# Patient Record
Sex: MALE | Race: WHITE | HISPANIC OR LATINO | Employment: OTHER | URBAN - METROPOLITAN AREA
[De-identification: names, ages, dates, MRNs, and addresses within clinical notes are randomized per-mention and may not be internally consistent; named-entity substitution may affect disease eponyms.]

---

## 2018-11-06 ENCOUNTER — HOSPITAL ENCOUNTER (EMERGENCY)
Facility: HOSPITAL | Age: 68
Discharge: HOME | End: 2018-11-06
Attending: EMERGENCY MEDICINE
Payer: MEDICARE

## 2018-11-06 VITALS
DIASTOLIC BLOOD PRESSURE: 65 MMHG | TEMPERATURE: 98 F | HEIGHT: 60 IN | WEIGHT: 125 LBS | OXYGEN SATURATION: 100 % | BODY MASS INDEX: 24.54 KG/M2 | RESPIRATION RATE: 16 BRPM | HEART RATE: 62 BPM | SYSTOLIC BLOOD PRESSURE: 122 MMHG

## 2018-11-06 DIAGNOSIS — R13.10 DYSPHAGIA, UNSPECIFIED TYPE: Primary | ICD-10-CM

## 2018-11-06 PROCEDURE — 99284 EMERGENCY DEPT VISIT MOD MDM: CPT

## 2018-11-07 NOTE — ED PROVIDER NOTES
Encounter Date: 11/6/2018    SCRIBE #1 NOTE: I, Hima Beck, am scribing for, and in the presence of,  . I have scribed the entire note.       History     Chief Complaint   Patient presents with    Foreign Body In Throat     foreign body in throat after eating chinese food yesterday     Time seen by provider: 7:15 PM    This is a 68 y.o. male who presents with complaint of a foreign body stuck in his throat after eating yesterday evening around 1800. He describes the pain as mild and localized at the back of the throat making it difficult for him to eat or swallow. He was able to eat small bites of sweet potato during lunch today.  No vomiting. The patient reports that he has a HX of food getting stuck in his throat. He denies any chest pain, shortness of breath, vomiting or nausea.                  The history is provided by the patient.     Review of patient's allergies indicates:  No Known Allergies  History reviewed. No pertinent past medical history.  Past Surgical History:   Procedure Laterality Date    HERNIA REPAIR       History reviewed. No pertinent family history.  Social History     Tobacco Use    Smoking status: Current Every Day Smoker   Substance Use Topics    Alcohol use: No    Drug use: Not on file     Review of Systems   HENT: Positive for sore throat and trouble swallowing.    Respiratory: Negative for shortness of breath.    All other systems reviewed and are negative.      Physical Exam     Initial Vitals [11/06/18 1839]   BP Pulse Resp Temp SpO2   126/75 80 16 98.3 °F (36.8 °C) 98 %      MAP       --         Physical Exam    Nursing note and vitals reviewed.  Constitutional: He appears well-developed and well-nourished. He is not diaphoretic. No distress.   HENT:   Head: Normocephalic and atraumatic.   Mouth/Throat: Oropharynx is clear and moist.   Eyes: Conjunctivae and EOM are normal. Pupils are equal, round, and reactive to light.   Neck: Normal range of motion. Neck supple. No  tracheal deviation present.   Cardiovascular: Normal rate, regular rhythm, normal heart sounds and intact distal pulses.   Pulmonary/Chest: Breath sounds normal. No stridor. No respiratory distress.   Abdominal: Soft. He exhibits no distension and no mass. There is no tenderness.   Musculoskeletal: Normal range of motion. He exhibits no edema or tenderness.   Neurological: He is alert and oriented to person, place, and time. He has normal strength. No cranial nerve deficit or sensory deficit.   Skin: Skin is warm and dry. Capillary refill takes less than 2 seconds. No rash noted.   Psychiatric: He has a normal mood and affect. His behavior is normal. Thought content normal.         ED Course   Procedures  Labs Reviewed - No data to display       Imaging Results          CT Chest Without Contrast (Final result)  Result time 11/06/18 20:47:15    Final result by Yoselyn Soto MD (11/06/18 20:47:15)                 Impression:      1. No acute intrathoracic abnormalities identified.  2. Cholelithiasis.      Electronically signed by: Yoselyn Soto MD  Date:    11/06/2018  Time:    20:47             Narrative:    EXAMINATION:  CT CHEST WITHOUT CONTRAST    CLINICAL HISTORY:  Dysphasia;    TECHNIQUE:  Low dose axial images, sagittal and coronal reformations were obtained from the thoracic inlet to the lung bases. Contrast was not administered.    COMPARISON:  None.    FINDINGS:  Structures at the base of the neck are unremarkable.  Aorta is non-aneurysmal.  The heart is normal in size without pericardial effusion.  There is no evidence of mediastinal, axillary, or hilar lymph node enlargement.  Scattered air is seen throughout the esophagus.    The trachea and bronchi are patent.  The lungs are symmetrically expanded.  Lungs show no evidence of mass, consolidation, or effusion    Small stones are seen in the gallbladder.  The visualized abdominal structures are otherwise unremarkable.  Osseous structures and  extrathoracic soft tissues are unremarkable.                                 Medical Decision Making:   Clinical Tests:   Radiological Study: Ordered and Reviewed  ED Management:  68-year-old male who felt as though something got stuck in his throat by eating vegetables last night.  Patient says he has discomfort when he swallows.  He was able to eat and drink throughout the day today with no vomiting.  He has been able to handle his oral secretions.  CT shows no abnormalities.  Based on all of this I do not feel the patient has an esophageal food bolus.  He will be discharged in stable condition and given instructions to follow up with Dr. Clement Mike, gastroenterology.                      Clinical Impression:     1. Dysphagia, unspecified type      Disposition:   Disposition: Discharged    I, Dr. Marco A Estrada, personally performed the services described in this documentation. All medical record entries made by the scribe were at my direction and in my presence. I have reviewed the chart and agree that the record reflects my personal performance and is accurate and complete. Marco A Estrada MD.  9:54 PM 11/06/2018                     Marco A Estrada MD  11/06/18 [id number]

## 2018-11-07 NOTE — ED NOTES
Patient a/ox4, rr even unlabored, color appropriate, skin warm and dry, speaks clear and complete sentences. No acute distress. To be discharged with follow up instructions with family. Verbalized understanding. Ambulatory from Ed with family

## 2020-11-01 ENCOUNTER — HOSPITAL ENCOUNTER (INPATIENT)
Facility: HOSPITAL | Age: 70
LOS: 2 days | Discharge: HOME HEALTH | DRG: 552 | End: 2020-11-04
Attending: EMERGENCY MEDICINE | Admitting: EMERGENCY MEDICINE
Payer: MEDICARE

## 2020-11-01 DIAGNOSIS — S22.008D CLOSED FRACTURE OF SPINOUS PROCESS OF THORACIC VERTEBRA WITH ROUTINE HEALING: ICD-10-CM

## 2020-11-01 DIAGNOSIS — S32.030A CLOSED COMPRESSION FRACTURE OF L3 LUMBAR VERTEBRA, INITIAL ENCOUNTER: Primary | ICD-10-CM

## 2020-11-01 DIAGNOSIS — M54.9 BACK PAIN: ICD-10-CM

## 2020-11-01 DIAGNOSIS — T79.6XXA TRAUMATIC RHABDOMYOLYSIS, INITIAL ENCOUNTER: ICD-10-CM

## 2020-11-01 DIAGNOSIS — Z72.0 TOBACCO USE: ICD-10-CM
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
## 2020-11-01 DIAGNOSIS — G89.11 ACUTE LOW BACK PAIN DUE TO TRAUMA: ICD-10-CM

## 2020-11-01 DIAGNOSIS — M25.552 LEFT HIP PAIN: ICD-10-CM

## 2020-11-01 DIAGNOSIS — M54.50 ACUTE LOW BACK PAIN DUE TO TRAUMA: ICD-10-CM

## 2020-11-01 DIAGNOSIS — M54.50 LUMBAR SPINE PAIN: ICD-10-CM

## 2020-11-01 LAB
ALBUMIN SERPL BCP-MCNC: 4.2 G/DL (ref 3.5–5.2)
ALP SERPL-CCNC: 76 U/L (ref 55–135)
ALT SERPL W/O P-5'-P-CCNC: 36 U/L (ref 10–44)
ANION GAP SERPL CALC-SCNC: 13 MMOL/L (ref 8–16)
AST SERPL-CCNC: 68 U/L (ref 10–40)
BASOPHILS # BLD AUTO: 0.03 K/UL (ref 0–0.2)
BASOPHILS NFR BLD: 0.3 % (ref 0–1.9)
BILIRUB SERPL-MCNC: 0.7 MG/DL (ref 0.1–1)
BILIRUB UR QL STRIP: NEGATIVE
BUN SERPL-MCNC: 18 MG/DL (ref 8–23)
BUN SERPL-MCNC: 20 MG/DL (ref 6–30)
CALCIUM SERPL-MCNC: 9 MG/DL (ref 8.7–10.5)
CHLORIDE SERPL-SCNC: 106 MMOL/L (ref 95–110)
CHLORIDE SERPL-SCNC: 107 MMOL/L (ref 95–110)
CLARITY UR REFRACT.AUTO: CLEAR
CO2 SERPL-SCNC: 21 MMOL/L (ref 23–29)
COLOR UR AUTO: YELLOW
CREAT SERPL-MCNC: 1 MG/DL (ref 0.5–1.4)
CREAT SERPL-MCNC: 1 MG/DL (ref 0.5–1.4)
DIFFERENTIAL METHOD: ABNORMAL
EOSINOPHIL # BLD AUTO: 0.1 K/UL (ref 0–0.5)
EOSINOPHIL NFR BLD: 0.9 % (ref 0–8)
ERYTHROCYTE [DISTWIDTH] IN BLOOD BY AUTOMATED COUNT: 13 % (ref 11.5–14.5)
EST. GFR  (AFRICAN AMERICAN): >60 ML/MIN/1.73 M^2
EST. GFR  (NON AFRICAN AMERICAN): >60 ML/MIN/1.73 M^2
GLUCOSE SERPL-MCNC: 136 MG/DL (ref 70–110)
GLUCOSE SERPL-MCNC: 136 MG/DL (ref 70–110)
GLUCOSE UR QL STRIP: NEGATIVE
HCT VFR BLD AUTO: 41.2 % (ref 40–54)
HCT VFR BLD CALC: 40 %PCV (ref 36–54)
HGB BLD-MCNC: 13.6 G/DL (ref 14–18)
HGB UR QL STRIP: ABNORMAL
IMM GRANULOCYTES # BLD AUTO: 0.08 K/UL (ref 0–0.04)
IMM GRANULOCYTES NFR BLD AUTO: 0.8 % (ref 0–0.5)
INR PPP: 1 (ref 0.8–1.2)
KETONES UR QL STRIP: NEGATIVE
LEUKOCYTE ESTERASE UR QL STRIP: NEGATIVE
LYMPHOCYTES # BLD AUTO: 1 K/UL (ref 1–4.8)
LYMPHOCYTES NFR BLD: 9.4 % (ref 18–48)
MCH RBC QN AUTO: 31.8 PG (ref 27–31)
MCHC RBC AUTO-ENTMCNC: 33 G/DL (ref 32–36)
MCV RBC AUTO: 96 FL (ref 82–98)
MICROSCOPIC COMMENT: NORMAL
MONOCYTES # BLD AUTO: 0.7 K/UL (ref 0.3–1)
MONOCYTES NFR BLD: 6.4 % (ref 4–15)
NEUTROPHILS # BLD AUTO: 8.7 K/UL (ref 1.8–7.7)
NEUTROPHILS NFR BLD: 82.2 % (ref 38–73)
NITRITE UR QL STRIP: NEGATIVE
NRBC BLD-RTO: 0 /100 WBC
PH UR STRIP: 7 [PH] (ref 5–8)
PLATELET # BLD AUTO: 152 K/UL (ref 150–350)
PMV BLD AUTO: 11.1 FL (ref 9.2–12.9)
POC IONIZED CALCIUM: 1.12 MMOL/L (ref 1.06–1.42)
POC TCO2 (MEASURED): 24 MMOL/L (ref 23–29)
POTASSIUM BLD-SCNC: 3.9 MMOL/L (ref 3.5–5.1)
POTASSIUM SERPL-SCNC: 4 MMOL/L (ref 3.5–5.1)
PROT SERPL-MCNC: 7.1 G/DL (ref 6–8.4)
PROT UR QL STRIP: NEGATIVE
PROTHROMBIN TIME: 10.8 SEC (ref 9–12.5)
RBC # BLD AUTO: 4.28 M/UL (ref 4.6–6.2)
RBC #/AREA URNS AUTO: 1 /HPF (ref 0–4)
SAMPLE: ABNORMAL
SODIUM BLD-SCNC: 140 MMOL/L (ref 136–145)
SODIUM SERPL-SCNC: 141 MMOL/L (ref 136–145)
SP GR UR STRIP: 1.02 (ref 1–1.03)
URN SPEC COLLECT METH UR: ABNORMAL
WBC # BLD AUTO: 10.57 K/UL (ref 3.9–12.7)
WBC #/AREA URNS AUTO: 1 /HPF (ref 0–5)

## 2020-11-01 PROCEDURE — 99285 EMERGENCY DEPT VISIT HI MDM: CPT | Mod: CS,,, | Performed by: EMERGENCY MEDICINE

## 2020-11-01 PROCEDURE — 99223 PR INITIAL HOSPITAL CARE,LEVL III: ICD-10-PCS | Mod: ,,, | Performed by: NEUROLOGICAL SURGERY

## 2020-11-01 PROCEDURE — 99223 1ST HOSP IP/OBS HIGH 75: CPT | Mod: ,,, | Performed by: NEUROLOGICAL SURGERY

## 2020-11-01 PROCEDURE — 80053 COMPREHEN METABOLIC PANEL: CPT

## 2020-11-01 PROCEDURE — 99285 EMERGENCY DEPT VISIT HI MDM: CPT | Mod: 25

## 2020-11-01 PROCEDURE — 80047 BASIC METABLC PNL IONIZED CA: CPT

## 2020-11-01 PROCEDURE — 96374 THER/PROPH/DIAG INJ IV PUSH: CPT

## 2020-11-01 PROCEDURE — 99285 PR EMERGENCY DEPT VISIT,LEVEL V: ICD-10-PCS | Mod: CS,,, | Performed by: EMERGENCY MEDICINE

## 2020-11-01 PROCEDURE — 63600175 PHARM REV CODE 636 W HCPCS: Performed by: EMERGENCY MEDICINE

## 2020-11-01 PROCEDURE — 25500020 PHARM REV CODE 255: Performed by: EMERGENCY MEDICINE

## 2020-11-01 PROCEDURE — 81001 URINALYSIS AUTO W/SCOPE: CPT

## 2020-11-01 PROCEDURE — 85025 COMPLETE CBC W/AUTO DIFF WBC: CPT

## 2020-11-01 PROCEDURE — 85610 PROTHROMBIN TIME: CPT

## 2020-11-01 PROCEDURE — 93005 ELECTROCARDIOGRAM TRACING: CPT

## 2020-11-01 RX ORDER — MORPHINE SULFATE 4 MG/ML
4 INJECTION, SOLUTION INTRAMUSCULAR; INTRAVENOUS
Status: COMPLETED | OUTPATIENT
Start: 2020-11-01 | End: 2020-11-02

## 2020-11-01 RX ORDER — MORPHINE SULFATE 4 MG/ML
4 INJECTION, SOLUTION INTRAMUSCULAR; INTRAVENOUS
Status: COMPLETED | OUTPATIENT
Start: 2020-11-01 | End: 2020-11-01

## 2020-11-01 RX ADMIN — IOHEXOL 75 ML: 350 INJECTION, SOLUTION INTRAVENOUS at 09:11

## 2020-11-01 RX ADMIN — MORPHINE SULFATE 4 MG: 4 INJECTION INTRAVENOUS at 06:11

## 2020-11-01 NOTE — LETTER
November 4, 2020         [address]  [facility] [address]  Phone: [phone]  Fax: [phone]       Patient: Lance Bob   YOB: 1950  Date of Visit: 11/04/2020    To Whom It May Concern:    Lance Bob  was at [facility] on 11/04/2020. He may return to work/school on 12/21/20 with heavy lifting restrictions. If you have any questions or concerns, or if I can be of further assistance, please do not hesitate to contact me.    Sincerely,    Laura Lora MD

## 2020-11-01 NOTE — LETTER
November 4, 2020         [address]  [facility] [address]  Phone: [phone]  Fax: [phone]       Patient: Lance Bob   YOB: 1950  Date of Visit: 11/04/2020    To Whom It May Concern:    Lance Bob  was at [facility] on 11/04/2020. He may return to work/school on 11/9/20 with the following restrictions: no lifting over 5lbs. If you have any questions or concerns, or if I can be of further assistance, please do not hesitate to contact me.    Sincerely,    Laura Lora MD

## 2020-11-02 PROBLEM — M25.552 LEFT HIP PAIN: Status: ACTIVE | Noted: 2020-11-02

## 2020-11-02 PROBLEM — T79.6XXA TRAUMATIC RHABDOMYOLYSIS: Status: ACTIVE | Noted: 2020-11-02

## 2020-11-02 LAB
ALBUMIN SERPL BCP-MCNC: 3.8 G/DL (ref 3.5–5.2)
ALP SERPL-CCNC: 73 U/L (ref 55–135)
ALT SERPL W/O P-5'-P-CCNC: 114 U/L (ref 10–44)
ANION GAP SERPL CALC-SCNC: 12 MMOL/L (ref 8–16)
AST SERPL-CCNC: 391 U/L (ref 10–40)
BASOPHILS # BLD AUTO: 0.02 K/UL (ref 0–0.2)
BASOPHILS NFR BLD: 0.3 % (ref 0–1.9)
BILIRUB SERPL-MCNC: 1.3 MG/DL (ref 0.1–1)
BUN SERPL-MCNC: 15 MG/DL (ref 8–23)
CA-I BLDV-SCNC: 1.18 MMOL/L (ref 1.06–1.42)
CALCIUM SERPL-MCNC: 8.4 MG/DL (ref 8.7–10.5)
CHLORIDE SERPL-SCNC: 109 MMOL/L (ref 95–110)
CK SERPL-CCNC: ABNORMAL U/L (ref 20–200)
CO2 SERPL-SCNC: 20 MMOL/L (ref 23–29)
CREAT SERPL-MCNC: 0.8 MG/DL (ref 0.5–1.4)
CTP QC/QA: YES
DIFFERENTIAL METHOD: ABNORMAL
EOSINOPHIL # BLD AUTO: 0 K/UL (ref 0–0.5)
EOSINOPHIL NFR BLD: 0.3 % (ref 0–8)
ERYTHROCYTE [DISTWIDTH] IN BLOOD BY AUTOMATED COUNT: 13.1 % (ref 11.5–14.5)
EST. GFR  (AFRICAN AMERICAN): >60 ML/MIN/1.73 M^2
EST. GFR  (NON AFRICAN AMERICAN): >60 ML/MIN/1.73 M^2
GLUCOSE SERPL-MCNC: 100 MG/DL (ref 70–110)
HCT VFR BLD AUTO: 41.6 % (ref 40–54)
HGB BLD-MCNC: 13.7 G/DL (ref 14–18)
IMM GRANULOCYTES # BLD AUTO: 0.01 K/UL (ref 0–0.04)
IMM GRANULOCYTES NFR BLD AUTO: 0.2 % (ref 0–0.5)
LYMPHOCYTES # BLD AUTO: 0.7 K/UL (ref 1–4.8)
LYMPHOCYTES NFR BLD: 12.4 % (ref 18–48)
MAGNESIUM SERPL-MCNC: 2.1 MG/DL (ref 1.6–2.6)
MCH RBC QN AUTO: 31.3 PG (ref 27–31)
MCHC RBC AUTO-ENTMCNC: 32.9 G/DL (ref 32–36)
MCV RBC AUTO: 95 FL (ref 82–98)
MONOCYTES # BLD AUTO: 0.7 K/UL (ref 0.3–1)
MONOCYTES NFR BLD: 10.9 % (ref 4–15)
NEUTROPHILS # BLD AUTO: 4.5 K/UL (ref 1.8–7.7)
NEUTROPHILS NFR BLD: 75.9 % (ref 38–73)
NRBC BLD-RTO: 0 /100 WBC
PHOSPHATE SERPL-MCNC: 3 MG/DL (ref 2.7–4.5)
PLATELET # BLD AUTO: 147 K/UL (ref 150–350)
PMV BLD AUTO: 10.8 FL (ref 9.2–12.9)
POTASSIUM SERPL-SCNC: 3.6 MMOL/L (ref 3.5–5.1)
POTASSIUM SERPL-SCNC: 3.9 MMOL/L (ref 3.5–5.1)
PROT SERPL-MCNC: 6.5 G/DL (ref 6–8.4)
RBC # BLD AUTO: 4.38 M/UL (ref 4.6–6.2)
SARS-COV-2 RDRP RESP QL NAA+PROBE: NEGATIVE
SODIUM SERPL-SCNC: 141 MMOL/L (ref 136–145)
WBC # BLD AUTO: 5.95 K/UL (ref 3.9–12.7)

## 2020-11-02 PROCEDURE — 63600175 PHARM REV CODE 636 W HCPCS: Performed by: STUDENT IN AN ORGANIZED HEALTH CARE EDUCATION/TRAINING PROGRAM

## 2020-11-02 PROCEDURE — A9585 GADOBUTROL INJECTION: HCPCS | Performed by: EMERGENCY MEDICINE

## 2020-11-02 PROCEDURE — 83735 ASSAY OF MAGNESIUM: CPT

## 2020-11-02 PROCEDURE — 25000003 PHARM REV CODE 250: Performed by: STUDENT IN AN ORGANIZED HEALTH CARE EDUCATION/TRAINING PROGRAM

## 2020-11-02 PROCEDURE — U0002 COVID-19 LAB TEST NON-CDC: HCPCS | Performed by: EMERGENCY MEDICINE

## 2020-11-02 PROCEDURE — 97116 GAIT TRAINING THERAPY: CPT

## 2020-11-02 PROCEDURE — 97165 OT EVAL LOW COMPLEX 30 MIN: CPT

## 2020-11-02 PROCEDURE — 80053 COMPREHEN METABOLIC PANEL: CPT

## 2020-11-02 PROCEDURE — 97161 PT EVAL LOW COMPLEX 20 MIN: CPT

## 2020-11-02 PROCEDURE — 84100 ASSAY OF PHOSPHORUS: CPT

## 2020-11-02 PROCEDURE — 97530 THERAPEUTIC ACTIVITIES: CPT

## 2020-11-02 PROCEDURE — 99223 PR INITIAL HOSPITAL CARE,LEVL III: ICD-10-PCS | Mod: AI,GC,, | Performed by: HOSPITALIST

## 2020-11-02 PROCEDURE — 84132 ASSAY OF SERUM POTASSIUM: CPT

## 2020-11-02 PROCEDURE — 96376 TX/PRO/DX INJ SAME DRUG ADON: CPT

## 2020-11-02 PROCEDURE — 82330 ASSAY OF CALCIUM: CPT

## 2020-11-02 PROCEDURE — 85025 COMPLETE CBC W/AUTO DIFF WBC: CPT

## 2020-11-02 PROCEDURE — 63600175 PHARM REV CODE 636 W HCPCS: Performed by: EMERGENCY MEDICINE

## 2020-11-02 PROCEDURE — 82550 ASSAY OF CK (CPK): CPT

## 2020-11-02 PROCEDURE — 11000001 HC ACUTE MED/SURG PRIVATE ROOM

## 2020-11-02 PROCEDURE — 99223 1ST HOSP IP/OBS HIGH 75: CPT | Mod: AI,GC,, | Performed by: HOSPITALIST

## 2020-11-02 PROCEDURE — 36415 COLL VENOUS BLD VENIPUNCTURE: CPT

## 2020-11-02 PROCEDURE — 25500020 PHARM REV CODE 255: Performed by: EMERGENCY MEDICINE

## 2020-11-02 RX ORDER — IBUPROFEN 200 MG
24 TABLET ORAL
Status: DISCONTINUED | OUTPATIENT
Start: 2020-11-02 | End: 2020-11-04 | Stop reason: HOSPADM

## 2020-11-02 RX ORDER — ENOXAPARIN SODIUM 100 MG/ML
40 INJECTION SUBCUTANEOUS EVERY 24 HOURS
Status: DISCONTINUED | OUTPATIENT
Start: 2020-11-02 | End: 2020-11-04 | Stop reason: HOSPADM

## 2020-11-02 RX ORDER — SODIUM CHLORIDE, SODIUM LACTATE, POTASSIUM CHLORIDE, CALCIUM CHLORIDE 600; 310; 30; 20 MG/100ML; MG/100ML; MG/100ML; MG/100ML
INJECTION, SOLUTION INTRAVENOUS CONTINUOUS
Status: DISCONTINUED | OUTPATIENT
Start: 2020-11-02 | End: 2020-11-02

## 2020-11-02 RX ORDER — METHOCARBAMOL 750 MG/1
750 TABLET, FILM COATED ORAL EVERY 8 HOURS PRN
Qty: 30 TABLET | Refills: 0 | Status: SHIPPED | OUTPATIENT
Start: 2020-11-02 | End: 2020-11-14

## 2020-11-02 RX ORDER — GADOBUTROL 604.72 MG/ML
6 INJECTION INTRAVENOUS
Status: COMPLETED | OUTPATIENT
Start: 2020-11-02 | End: 2020-11-02

## 2020-11-02 RX ORDER — ACETAMINOPHEN 500 MG
1000 TABLET ORAL EVERY 8 HOURS PRN
Status: DISCONTINUED | OUTPATIENT
Start: 2020-11-02 | End: 2020-11-04 | Stop reason: HOSPADM

## 2020-11-02 RX ORDER — MORPHINE SULFATE 4 MG/ML
4 INJECTION, SOLUTION INTRAMUSCULAR; INTRAVENOUS
Status: COMPLETED | OUTPATIENT
Start: 2020-11-02 | End: 2020-11-02

## 2020-11-02 RX ORDER — LIDOCAINE 50 MG/G
1 PATCH TOPICAL DAILY
Status: DISCONTINUED | OUTPATIENT
Start: 2020-11-02 | End: 2020-11-04 | Stop reason: HOSPADM

## 2020-11-02 RX ORDER — MULTIVITAMIN
1 TABLET ORAL DAILY
COMMUNITY

## 2020-11-02 RX ORDER — MORPHINE SULFATE 4 MG/ML
4 INJECTION, SOLUTION INTRAMUSCULAR; INTRAVENOUS EVERY 6 HOURS PRN
Status: DISCONTINUED | OUTPATIENT
Start: 2020-11-02 | End: 2020-11-04 | Stop reason: HOSPADM

## 2020-11-02 RX ORDER — OXYCODONE HYDROCHLORIDE 5 MG/1
10 TABLET ORAL EVERY 6 HOURS PRN
Status: DISCONTINUED | OUTPATIENT
Start: 2020-11-02 | End: 2020-11-02

## 2020-11-02 RX ORDER — ONDANSETRON 8 MG/1
8 TABLET, ORALLY DISINTEGRATING ORAL EVERY 8 HOURS PRN
Status: DISCONTINUED | OUTPATIENT
Start: 2020-11-02 | End: 2020-11-04 | Stop reason: HOSPADM

## 2020-11-02 RX ORDER — SODIUM CHLORIDE, SODIUM LACTATE, POTASSIUM CHLORIDE, CALCIUM CHLORIDE 600; 310; 30; 20 MG/100ML; MG/100ML; MG/100ML; MG/100ML
INJECTION, SOLUTION INTRAVENOUS CONTINUOUS
Status: ACTIVE | OUTPATIENT
Start: 2020-11-02 | End: 2020-11-03

## 2020-11-02 RX ORDER — METHOCARBAMOL 750 MG/1
750 TABLET, FILM COATED ORAL EVERY 8 HOURS PRN
Status: DISCONTINUED | OUTPATIENT
Start: 2020-11-02 | End: 2020-11-04 | Stop reason: HOSPADM

## 2020-11-02 RX ORDER — POLYETHYLENE GLYCOL 3350 17 G/17G
17 POWDER, FOR SOLUTION ORAL DAILY
Status: DISCONTINUED | OUTPATIENT
Start: 2020-11-02 | End: 2020-11-04 | Stop reason: HOSPADM

## 2020-11-02 RX ORDER — NALOXONE HCL 0.4 MG/ML
0.4 VIAL (ML) INJECTION
Status: DISCONTINUED | OUTPATIENT
Start: 2020-11-02 | End: 2020-11-04 | Stop reason: HOSPADM

## 2020-11-02 RX ORDER — GLUCAGON 1 MG
1 KIT INJECTION
Status: DISCONTINUED | OUTPATIENT
Start: 2020-11-02 | End: 2020-11-04 | Stop reason: HOSPADM

## 2020-11-02 RX ORDER — IBUPROFEN 200 MG
16 TABLET ORAL
Status: DISCONTINUED | OUTPATIENT
Start: 2020-11-02 | End: 2020-11-04 | Stop reason: HOSPADM

## 2020-11-02 RX ORDER — IBUPROFEN 200 MG
400 TABLET ORAL EVERY 6 HOURS PRN
COMMUNITY

## 2020-11-02 RX ORDER — SODIUM CHLORIDE 0.9 % (FLUSH) 0.9 %
10 SYRINGE (ML) INJECTION
Status: DISCONTINUED | OUTPATIENT
Start: 2020-11-02 | End: 2020-11-04 | Stop reason: HOSPADM

## 2020-11-02 RX ORDER — HYDROCODONE BITARTRATE AND ACETAMINOPHEN 7.5; 325 MG/1; MG/1
1 TABLET ORAL EVERY 6 HOURS PRN
Qty: 28 TABLET | Refills: 0 | Status: SHIPPED | OUTPATIENT
Start: 2020-11-02 | End: 2020-11-11

## 2020-11-02 RX ORDER — HYDROCODONE BITARTRATE AND ACETAMINOPHEN 7.5; 325 MG/1; MG/1
1 TABLET ORAL EVERY 6 HOURS PRN
Status: DISCONTINUED | OUTPATIENT
Start: 2020-11-02 | End: 2020-11-04 | Stop reason: HOSPADM

## 2020-11-02 RX ADMIN — POTASSIUM BICARBONATE 50 MEQ: 978 TABLET, EFFERVESCENT ORAL at 09:11

## 2020-11-02 RX ADMIN — MORPHINE SULFATE 4 MG: 4 INJECTION INTRAVENOUS at 12:11

## 2020-11-02 RX ADMIN — HYDROCODONE BITARTRATE AND ACETAMINOPHEN 1 TABLET: 7.5; 325 TABLET ORAL at 03:11

## 2020-11-02 RX ADMIN — ENOXAPARIN SODIUM 40 MG: 40 INJECTION SUBCUTANEOUS at 06:11

## 2020-11-02 RX ADMIN — MORPHINE SULFATE 4 MG: 4 INJECTION INTRAVENOUS at 04:11

## 2020-11-02 RX ADMIN — POLYETHYLENE GLYCOL 3350 17 G: 17 POWDER, FOR SOLUTION ORAL at 09:11

## 2020-11-02 RX ADMIN — LIDOCAINE 1 PATCH: 50 PATCH TOPICAL at 08:11

## 2020-11-02 RX ADMIN — SODIUM CHLORIDE, SODIUM LACTATE, POTASSIUM CHLORIDE, AND CALCIUM CHLORIDE: 600; 310; 30; 20 INJECTION, SOLUTION INTRAVENOUS at 11:11

## 2020-11-02 RX ADMIN — HYDROCODONE BITARTRATE AND ACETAMINOPHEN 1 TABLET: 7.5; 325 TABLET ORAL at 10:11

## 2020-11-02 RX ADMIN — GADOBUTROL 6 ML: 604.72 INJECTION INTRAVENOUS at 12:11

## 2020-11-02 NOTE — SUBJECTIVE & OBJECTIVE
History reviewed. No pertinent past medical history.    Past Surgical History:   Procedure Laterality Date    HERNIA REPAIR         Review of patient's allergies indicates:  No Known Allergies    No current facility-administered medications on file prior to encounter.      No current outpatient medications on file prior to encounter.     Family History     None        Tobacco Use    Smoking status: Current Every Day Smoker   Substance and Sexual Activity    Alcohol use: No    Drug use: Not on file    Sexual activity: Not on file     Review of Systems   Constitutional: Negative for chills and fever.   HENT: Negative for congestion, rhinorrhea and sore throat.    Eyes: Negative for visual disturbance.   Respiratory: Negative for cough and shortness of breath.    Cardiovascular: Negative for chest pain, palpitations and leg swelling.   Gastrointestinal: Negative for abdominal pain, constipation, diarrhea, nausea and vomiting.   Genitourinary: Negative for dysuria, flank pain and hematuria.   Musculoskeletal: Positive for arthralgias, back pain, gait problem and myalgias.   Skin: Negative for color change.   Neurological: Negative for dizziness, speech difficulty, weakness, light-headedness and numbness.     Objective:     Vital Signs (Most Recent):  Temp: 98.5 °F (36.9 °C) (11/01/20 1815)  Pulse: 83 (11/01/20 2247)  Resp: 18 (11/02/20 0453)  BP: (!) 141/84 (11/01/20 2247)  SpO2: 99 % (11/01/20 2247) Vital Signs (24h Range):  Temp:  [98.5 °F (36.9 °C)] 98.5 °F (36.9 °C)  Pulse:  [] 83  Resp:  [16-18] 18  SpO2:  [98 %-100 %] 99 %  BP: (130-145)/() 141/84     Weight: 56.2 kg (124 lb)  Body mass index is 22.68 kg/m².    Physical Exam  Constitutional:       General: He is not in acute distress.     Appearance: Normal appearance. He is not ill-appearing.   HENT:      Head: Normocephalic and atraumatic.      Nose: Nose normal. No congestion or rhinorrhea.      Mouth/Throat:      Mouth: Mucous membranes are  moist.      Pharynx: Oropharynx is clear.   Eyes:      Conjunctiva/sclera: Conjunctivae normal.      Pupils: Pupils are equal, round, and reactive to light.   Neck:      Musculoskeletal: Normal range of motion and neck supple.   Cardiovascular:      Rate and Rhythm: Normal rate and regular rhythm.      Pulses: Normal pulses.      Heart sounds: Normal heart sounds.   Pulmonary:      Effort: Pulmonary effort is normal.      Breath sounds: Normal breath sounds. No wheezing, rhonchi or rales.   Abdominal:      General: Abdomen is flat.      Palpations: Abdomen is soft.      Tenderness: There is no abdominal tenderness.   Musculoskeletal: Normal range of motion.         General: No tenderness or deformity.   Skin:     General: Skin is warm and dry.   Neurological:      General: No focal deficit present.      Mental Status: He is alert and oriented to person, place, and time.           CRANIAL NERVES     CN III, IV, VI   Pupils are equal, round, and reactive to light.       Significant Labs:   CBC:   Recent Labs   Lab 11/01/20  1850 11/01/20  1900   WBC 10.57  --    HGB 13.6*  --    HCT 41.2 40     --      CMP:   Recent Labs   Lab 11/01/20  1850      K 4.0      CO2 21*   *   BUN 18   CREATININE 1.0   CALCIUM 9.0   PROT 7.1   ALBUMIN 4.2   BILITOT 0.7   ALKPHOS 76   AST 68*   ALT 36   ANIONGAP 13   EGFRNONAA >60.0     Coagulation:   Recent Labs   Lab 11/01/20  1850   INR 1.0     Urine Studies:   Recent Labs   Lab 11/01/20  2158   COLORU Yellow   APPEARANCEUA Clear   PHUR 7.0   SPECGRAV 1.025   PROTEINUA Negative   GLUCUA Negative   KETONESU Negative   BILIRUBINUA Negative   OCCULTUA 3+*   NITRITE Negative   LEUKOCYTESUR Negative   RBCUA 1   WBCUA 1       Significant Imaging: MRI Lumbar Spine W WO Cont  Narrative: EXAMINATION:  MRI LUMBAR SPINE W WO CONTRAST    CLINICAL HISTORY:  L/S-spine fracture, pathological; patient post trauma and fall with back injury and L2 left transverse process  fracture.    TECHNIQUE:  Multiplanar, multisequence MR images were acquired from the thoracolumbar junction to the sacrum without and with IV injection of 6 cc of Gadavist contrast.    COMPARISON:  CTA of the lumbar spine 11/01/2020    FINDINGS:  Alignment: There is no subluxation.    Vertebrae: Note is made of a superior endplate compression fracture of the L3 vertebral body more pronounced on the right side extending towards the base of the right pedicle.  There is right-sided mild wedge deformity and cortical irregularity with relative levoscoliosis noted on the prior CT as result.  There is no retropulsion into the spinal canal or significant listhesis.    There is no evidence of fracture extension into the right pedicle or posterior elements of the vertebra L3.    The L2 transverse process fracture as noted by CT is seen to a limited degree due to the lack of fat saturation on the axial series but it is present with expected enhancement of the surrounding soft tissues related to edema/posttraumatic changes.  The presence of a definitive soft tissue enhancing mass to suggest pathologic fracture is not confirmed as imaged.  There is also limited visualization of the transverse process fracture on the left on sagittal imaging with fat saturation limiting characterization.    There are no other marrow signal abnormalities throughout the lumbar vertebrae.    Cord: Normal signal in the visualized distal spinal cord is noted.  The conus terminates at the T12-L1 disc level    Degenerative findings:    T12-L1: No dorsal focal disc abnormality or stenosis.    L1-L2: There is moderate spondylosis with broad-based small dorsal disc protrusion more pronounced in the left foraminal region.  There is no canal stenosis or significant neural foraminal stenosis.    L2-L3: B mild-to-moderate spondylosis with broad-based small dorsal annular bulge/protrusion is noted.  There is no canal or neural foraminal stenosis.  Facet  hypertrophic changes are present.    L3-L4: There is minor dorsal annular bulging without significant disc protrusion or canal or neural foraminal stenosis.  Mild facet hypertrophy bilaterally is noted.    L4-L5: There is broad-based small disc protrusion in the canal which extends to the neural foramina bilaterally.  There is no canal stenosis.  Facet and ligamentous mild bilateral hypertrophy is noted contributing to mild bilateral inferior neural foraminal stenosis.    L5-S1: There is moderate spondylosis and a small broad-based dorsal disc protrusion.  There is no canal stenosis or significant neural foraminal stenosis.  Mild to moderate bilateral facet hypertrophic changes are present.    Sacroiliac joint degenerative changes are seen.    Paraspinous soft tissues and musculature are remarkable for muscular edematous changes in the left pelvic floor musculature which is only seen on a few slices of the sagittal series 7 images 11 through 16.    Multiple cystic foci in the kidneys with dominant lesion on the right noted with several incompletely visualized.  Impression: Superior endplate mild compression fracture of the L3 vertebra with mild wedging to the right.  No evidence of fracture retropulsion, canal hematoma or vertebral body listhesis is seen as result.  There is no visible extension a fracture into the posterior elements.    The left transverse process fracture at L2 is not very well demonstrated with MRI without fat saturation in the axial plane.  There is no significant marrow enhancing lesions to support a pathologic fracture related to a mass/neoplasm with certainty.  The fracture could explained by trauma particularly with an adjacent vertebral body compression.  Follow-up is suggested and additional sequences with fat saturation suggested at the time of follow-up.    No suspicious marrow lesions or enhancing lesions in the vertebral bodies are noted as imaged.    Multilevel spondylosis without  canal or significant neural foraminal stenosis.  Detailed findings are described above.    Paraspinous pelvic floor muscular edematous changes on the left are likely post-traumatic on the left and are partially visualized.    Multiple renal cystic foci with several incompletely imaged.  These findings are better characterized on the abdomen pelvis CT 11/01/2020.    This report was flagged in Epic as abnormal.    Electronically signed by: Nuria Esparza  Date:    11/02/2020  Time:    01:54

## 2020-11-02 NOTE — PLAN OF CARE
Problem: Occupational Therapy Goal  Goal: Occupational Therapy Goal  Description: Goals to be met by: 11/12/20    Patient will increase functional independence with ADLs by performing:    UE Dressing with Set-up Assistance.  LE Dressing with Set-up Assistance and Assistive Devices as needed.  Grooming while standing at sink with Supervision.  Toileting from toilet with Supervision for hygiene and clothing management.   Supine to sit with Supervision to increase bed mobility independence.  Step transfer with Stand-by Assistance with AD as needed to prepare for household mobility to complete occupations of choice.   Toilet transfer to toilet with Supervision.  Pt and caregiver to don LSO brace I'ly for safe completion of OOB ax's.    Outcome: Ongoing, Progressing     OT evaluation complete and POC established. See evaluation note for further details.   Disposition recommendation: KETURAH Zavala OTR/L  11/2/20

## 2020-11-02 NOTE — PT/OT/SLP EVAL
Occupational Therapy   Co-Evaluation/ Treatment    Name: Lance Bob  MRN: [id number]  Admitting Diagnosis:  Traumatic rhabdomyolysis      Recommendations:     Discharge Recommendations: home health OT, home health PT  Discharge Equipment Recommendations:  bedside commode, bath bench, walker, rolling  Barriers to discharge:  None    Assessment:     Lance Bob is a 70 y.o. male with a medical diagnosis of Traumatic rhabdomyolysis.  He presents with the following performance deficits affecting function: weakness, impaired balance, pain, impaired self care skills, impaired functional mobilty, decreased lower extremity function, decreased upper extremity function. Pt tolerated assessment fairly well this date with pain being his main functional limitation. Pt's back and hip pain affected his pace and quality of movent during functional tasks. Pt utilized RW for mobility with min A for short distance in the room. Pt max A for LB dressing due to pain.  for Australian utilized throughout the session. Pt with good rehab prognosis to return home with home health when medically stable. At this time, pt will benefit from skilled OT to build strength and endurance to increase independence in functional tasks and prevent further debility.       Rehab Prognosis: Good; patient would benefit from acute skilled OT services to address these deficits and reach maximum level of function.       Plan:     Patient to be seen 4 x/week to address the above listed problems via self-care/home management, therapeutic activities, therapeutic exercises, neuromuscular re-education  · Plan of Care Expires: 12/01/20  · Plan of Care Reviewed with: patient, spouse    Subjective     Chief Complaint: Back and R hip pain  Patient/Family Comments/goals: Pt agreeable to OT/PT evaluation and OT POC.     Occupational Profile:  Living Environment: Pt lives with wife in a ST with threshold DEBBIE. Bathroom set up: Step in tub  Previous level of  function: I in all ADLs/IADLs including driving; no previous hx of falls  Roles and Routines: Homemaker, spouse, enjoys yardwork  Equipment Used at Home:  none  Assistance upon Discharge: Pt will have assistance from wife and son upon discharge.     Pain/Comfort:  · Pain Rating 1: 10/10  · Location - Side 1: Right  · Location - Orientation 1: generalized  · Location 1: hip  · Pain Addressed 1: Pre-medicate for activity, Reposition, Cessation of Activity, Nurse notified  · Pain Rating Post-Intervention 1: 10/10  · Pain Addressed 2: Pre-medicate for activity, Cessation of Activity    Patients cultural, spiritual, Samaritan conflicts given the current situation: no    Objective:     Communicated with: RN prior to session.  Patient found supine with telemetry, pulse ox (continuous), blood pressure cuff upon OT entry to room.    General Precautions: Standard, fall   Orthopedic Precautions:spinal precautions   Braces: LSO     Occupational Performance:    Bed Mobility:    · Patient completed Rolling/Turning to Right with moderate assistance  · Patient completed Scooting/Bridging with moderate assistance  · Patient completed Supine to Sit with moderate assistance  · Patient completed Sit to Supine with moderate assistance    Functional Mobility/Transfers:  · Patient completed Sit <> Stand Transfer from stretcher with minimum assistance and of 2 persons  with  hand-held assist   · Min A of 1 person with RW on trial x2  · Functional Mobility: Pt ambulated short distance within the room with min A and RW.   · No LOB noted  · Increased pain reported     Activities of Daily Living:  · Lower Body Dressing: maximal assistance to don socks for transfers and mobility    Cognitive/Visual Perceptual:  Cognitive/Psychosocial Skills:     -       Oriented to: Person, Place, Time and Situation   -       Follows Commands/attention:Follows multistep  commands  -       Communication: clear/fluent and Gambian and english speaking  -        Memory: No Deficits noted  -       Safety awareness/insight to disability: intact   -       Mood/Affect/Coping skills/emotional control: Appropriate to situation  Visual/Perceptual:      -Intact no defs noted    Physical Exam:  Balance:    -       CGA seated on stretcher: CGA-min A for standing with RW  Postural examination/scapula alignment:    -       Rounded shoulders  Skin integrity: Visible skin intact  Edema:  None noted  Sensation:    -       Intact  Upper Extremity Range of Motion:     -       Right Upper Extremity: WFL  -       Left Upper Extremity: WFL  Upper Extremity Strength:    -       Right Upper Extremity: WFL  -       Left Upper Extremity: WFL   Strength:    -       Right Upper Extremity: WFL  -       Left Upper Extremity: WFL  Fine Motor Coordination:    -       Intact    AMPAC 6 Click ADL:  AMPAC Total Score: 14    Treatment & Education:  - Role of OT/ OT POC  - Self care safety/ independence  - Functional transfer/ mobility safety  - Bed mobility safety  - Pursed lip breathing for pain management   - Energy conservation techniques such as pacing and taking rest breaks as needed  - LSO donning performed EOB with education provided   - Spinal precautions   Education:    Patient left supine with all lines intact, call button in reach, RN notified and wife present    GOALS:   Multidisciplinary Problems     Occupational Therapy Goals        Problem: Occupational Therapy Goal    Goal Priority Disciplines Outcome Interventions   Occupational Therapy Goal     OT, PT/OT Ongoing, Progressing    Description: Goals to be met by: 11/12/20    Patient will increase functional independence with ADLs by performing:    UE Dressing with Set-up Assistance.  LE Dressing with Set-up Assistance and Assistive Devices as needed.  Grooming while standing at sink with Supervision.  Toileting from toilet with Supervision for hygiene and clothing management.   Supine to sit with Supervision to increase bed mobility  independence.  Step transfer with Stand-by Assistance with AD as needed to prepare for household mobility to complete occupations of choice.   Toilet transfer to toilet with Supervision.  Pt and caregiver to don LSO brace I'ly for safe completion of OOB ax's.                     History:     History reviewed. No pertinent past medical history.    Past Surgical History:   Procedure Laterality Date    HERNIA REPAIR         Time Tracking:     OT Date of Treatment: 11/02/20  OT Start Time: 0844  OT Stop Time: 0923  OT Total Time (min): 39 min co eval with PT for initial assessment     Billable Minutes:Evaluation 13  Therapeutic Activity 23    Haily Zavala, OT  11/2/2020

## 2020-11-02 NOTE — ED NOTES
Pt identifiers Lanceuyen FarrellBob were checked and are correct  LOC: The patient is awake, alert, aware of environment with an appropriate affect. Oriented x4 , speaking appropriately  APPEARANCE: Pt rates lower back pain and left hip pain an 8/10 , in no acute distress, pt is clean and well groomed, clothing properly fastened  SKIN: Skin warm, dry and intact, normal skin turgor, moist mucus membranes  RESPIRATORY: Airway is open and patent, respirations are spontaneous, even and unlabored, normal effort and rate Breath sounds clear ellie to all lung fields on auscultation  CARDIAC: Normal rate and rhythm, no peripheral edema noted, capillary refill < 3 seconds, bilateral radial pulses 2+  ABDOMEN: Soft, nontender, nondistended. Bowel sounds present to all four quad of abd on auscultation  NEUROLOGIC: PERRL, facial expression is symmetrical, patient moving all extremities spontaneously, normal sensation in all extremities when touched with a finger.  Follows all commands appropriately  MUSCULOSKELETAL: No obvious deformities.

## 2020-11-02 NOTE — HPI
71 y/o M  who had a tree fall on him ( was cutting trees)  found to have T6 spinous process fx and left L2 TP fx with adjacent possible lytic lesion. He denies weakness or change in sensation or difficulty with bowel or bladder control.

## 2020-11-02 NOTE — H&P
[facility] Hospital Medicine  History & Physical    Patient Name: Lance Bob  MRN: [id number]  Admission Date: 11/1/2020  Attending Physician: Austin Call MD   Primary Care Provider: Primary Doctor [facility] Medicine Team: Networked reference to record PCT  Vlad Blakely MD     Patient information was obtained from patient, past medical records and ER records.     Subjective:     Principal Problem:Closed fracture of spinous process of thoracic vertebra with routine healing    Chief Complaint:   Chief Complaint   Patient presents with    Back Injury     he was chopping another tree when a tree split and fell onto his back. No LOC. 50mcg of Fentanyl given by EMS        HPI: 70 year old male with no known significant medical history presents after a tree fell on to his back. He is complaining of back pain and hip pain which is 10/10. This began immediately after the tree fell onto him. He denies head trama, loss of consciousness. Denies any infective symptoms. Denies numbness, tingling, loss of bladder or bowel function.   In the ED he was hemodynamically stable and afebrile. Imaging revealed fracture of T6 and L2 fractures. Neurosurgery was consulted and have no neurosurgical intervention planned at this time. He was admitted to hospital medicine for pain management.      History reviewed. No pertinent past medical history.    Past Surgical History:   Procedure Laterality Date    HERNIA REPAIR         Review of patient's allergies indicates:  No Known Allergies    No current facility-administered medications on file prior to encounter.      No current outpatient medications on file prior to encounter.     Family History     None        Tobacco Use    Smoking status: Current Every Day Smoker   Substance and Sexual Activity    Alcohol use: No    Drug use: Not on file    Sexual activity: Not on file     Review of Systems   Constitutional: Negative for chills and fever.    HENT: Negative for congestion, rhinorrhea and sore throat.    Eyes: Negative for visual disturbance.   Respiratory: Negative for cough and shortness of breath.    Cardiovascular: Negative for chest pain, palpitations and leg swelling.   Gastrointestinal: Negative for abdominal pain, constipation, diarrhea, nausea and vomiting.   Genitourinary: Negative for dysuria, flank pain and hematuria.   Musculoskeletal: Positive for arthralgias, back pain, gait problem and myalgias.   Skin: Negative for color change.   Neurological: Negative for dizziness, speech difficulty, weakness, light-headedness and numbness.     Objective:     Vital Signs (Most Recent):  Temp: 98.5 °F (36.9 °C) (11/01/20 1815)  Pulse: 83 (11/01/20 2247)  Resp: 18 (11/02/20 0453)  BP: (!) 141/84 (11/01/20 2247)  SpO2: 99 % (11/01/20 2247) Vital Signs (24h Range):  Temp:  [98.5 °F (36.9 °C)] 98.5 °F (36.9 °C)  Pulse:  [] 83  Resp:  [16-18] 18  SpO2:  [98 %-100 %] 99 %  BP: (130-145)/() 141/84     Weight: 56.2 kg (124 lb)  Body mass index is 22.68 kg/m².    Physical Exam  Constitutional:       General: He is not in acute distress.     Appearance: Normal appearance. He is not ill-appearing.   HENT:      Head: Normocephalic and atraumatic.      Nose: Nose normal. No congestion or rhinorrhea.      Mouth/Throat:      Mouth: Mucous membranes are moist.      Pharynx: Oropharynx is clear.   Eyes:      Conjunctiva/sclera: Conjunctivae normal.      Pupils: Pupils are equal, round, and reactive to light.   Neck:      Musculoskeletal: Normal range of motion and neck supple.   Cardiovascular:      Rate and Rhythm: Normal rate and regular rhythm.      Pulses: Normal pulses.      Heart sounds: Normal heart sounds.   Pulmonary:      Effort: Pulmonary effort is normal.      Breath sounds: Normal breath sounds. No wheezing, rhonchi or rales.   Abdominal:      General: Abdomen is flat.      Palpations: Abdomen is soft.      Tenderness: There is no abdominal  tenderness.   Musculoskeletal: Normal range of motion.         General: No tenderness or deformity.   Skin:     General: Skin is warm and dry.   Neurological:      General: No focal deficit present.      Mental Status: He is alert and oriented to person, place, and time.           CRANIAL NERVES     CN III, IV, VI   Pupils are equal, round, and reactive to light.       Significant Labs:   CBC:   Recent Labs   Lab 11/01/20 1850 11/01/20  1900   WBC 10.57  --    HGB 13.6*  --    HCT 41.2 40     --      CMP:   Recent Labs   Lab 11/01/20  1850      K 4.0      CO2 21*   *   BUN 18   CREATININE 1.0   CALCIUM 9.0   PROT 7.1   ALBUMIN 4.2   BILITOT 0.7   ALKPHOS 76   AST 68*   ALT 36   ANIONGAP 13   EGFRNONAA >60.0     Coagulation:   Recent Labs   Lab 11/01/20  1850   INR 1.0     Urine Studies:   Recent Labs   Lab 11/01/20  2158   COLORU Yellow   APPEARANCEUA Clear   PHUR 7.0   SPECGRAV 1.025   PROTEINUA Negative   GLUCUA Negative   KETONESU Negative   BILIRUBINUA Negative   OCCULTUA 3+*   NITRITE Negative   LEUKOCYTESUR Negative   RBCUA 1   WBCUA 1       Significant Imaging: MRI Lumbar Spine W WO Cont  Narrative: EXAMINATION:  MRI LUMBAR SPINE W WO CONTRAST    CLINICAL HISTORY:  L/S-spine fracture, pathological; patient post trauma and fall with back injury and L2 left transverse process fracture.    TECHNIQUE:  Multiplanar, multisequence MR images were acquired from the thoracolumbar junction to the sacrum without and with IV injection of 6 cc of Gadavist contrast.    COMPARISON:  CTA of the lumbar spine 11/01/2020    FINDINGS:  Alignment: There is no subluxation.    Vertebrae: Note is made of a superior endplate compression fracture of the L3 vertebral body more pronounced on the right side extending towards the base of the right pedicle.  There is right-sided mild wedge deformity and cortical irregularity with relative levoscoliosis noted on the prior CT as result.  There is no retropulsion  into the spinal canal or significant listhesis.    There is no evidence of fracture extension into the right pedicle or posterior elements of the vertebra L3.    The L2 transverse process fracture as noted by CT is seen to a limited degree due to the lack of fat saturation on the axial series but it is present with expected enhancement of the surrounding soft tissues related to edema/posttraumatic changes.  The presence of a definitive soft tissue enhancing mass to suggest pathologic fracture is not confirmed as imaged.  There is also limited visualization of the transverse process fracture on the left on sagittal imaging with fat saturation limiting characterization.    There are no other marrow signal abnormalities throughout the lumbar vertebrae.    Cord: Normal signal in the visualized distal spinal cord is noted.  The conus terminates at the T12-L1 disc level    Degenerative findings:    T12-L1: No dorsal focal disc abnormality or stenosis.    L1-L2: There is moderate spondylosis with broad-based small dorsal disc protrusion more pronounced in the left foraminal region.  There is no canal stenosis or significant neural foraminal stenosis.    L2-L3: B mild-to-moderate spondylosis with broad-based small dorsal annular bulge/protrusion is noted.  There is no canal or neural foraminal stenosis.  Facet hypertrophic changes are present.    L3-L4: There is minor dorsal annular bulging without significant disc protrusion or canal or neural foraminal stenosis.  Mild facet hypertrophy bilaterally is noted.    L4-L5: There is broad-based small disc protrusion in the canal which extends to the neural foramina bilaterally.  There is no canal stenosis.  Facet and ligamentous mild bilateral hypertrophy is noted contributing to mild bilateral inferior neural foraminal stenosis.    L5-S1: There is moderate spondylosis and a small broad-based dorsal disc protrusion.  There is no canal stenosis or significant neural foraminal  stenosis.  Mild to moderate bilateral facet hypertrophic changes are present.    Sacroiliac joint degenerative changes are seen.    Paraspinous soft tissues and musculature are remarkable for muscular edematous changes in the left pelvic floor musculature which is only seen on a few slices of the sagittal series 7 images 11 through 16.    Multiple cystic foci in the kidneys with dominant lesion on the right noted with several incompletely visualized.  Impression: Superior endplate mild compression fracture of the L3 vertebra with mild wedging to the right.  No evidence of fracture retropulsion, canal hematoma or vertebral body listhesis is seen as result.  There is no visible extension a fracture into the posterior elements.    The left transverse process fracture at L2 is not very well demonstrated with MRI without fat saturation in the axial plane.  There is no significant marrow enhancing lesions to support a pathologic fracture related to a mass/neoplasm with certainty.  The fracture could explained by trauma particularly with an adjacent vertebral body compression.  Follow-up is suggested and additional sequences with fat saturation suggested at the time of follow-up.    No suspicious marrow lesions or enhancing lesions in the vertebral bodies are noted as imaged.    Multilevel spondylosis without canal or significant neural foraminal stenosis.  Detailed findings are described above.    Paraspinous pelvic floor muscular edematous changes on the left are likely post-traumatic on the left and are partially visualized.    Multiple renal cystic foci with several incompletely imaged.  These findings are better characterized on the abdomen pelvis CT 11/01/2020.    This report was flagged in Epic as abnormal.    Electronically signed by: Nuria Esparza  Date:    11/02/2020  Time:    01:54        Assessment/Plan:     * Closed fracture of spinous process of thoracic vertebra with routine healing  71 y/o M  who had a  tree fall on him ( was cutting trees)  found to have T6 spinous process fx and left L2 TP fx with no neurological deficits. Unable to walk due to pain.  CT findings of T6 and L2 fractures, with possible lytic lesion at L2  MRI to evaluate lytic lesion - The presence of a definitive soft tissue enhancing mass to suggest pathologic fracture is not confirmed as imaged    Plan  - pain management Norco 7.5 Q6 PRN, morphine 4mg IV Q6 for breakthrough, methocarbamol 750 q8 PRN, tylenol PRN, lidocaine patch  - narcan reversal ordered  - PT/OT evaluation  - neurosurgery consulted in ED, no intervention at this time.  - q4 neuro checks  - PT/OT  - fall precautions        VTE Risk Mitigation (From admission, onward)         Ordered     enoxaparin injection 40 mg  Every 24 hours      11/02/20 0526     IP VTE HIGH RISK PATIENT  Once      11/02/20 0525     Place sequential compression device  Until discontinued      11/02/20 0525     Place sequential compression device  Until discontinued      11/02/20 0444                   Vlad Blakely MD  Department of Hospital Medicine   [facility]

## 2020-11-02 NOTE — PROVIDER PROGRESS NOTES - EMERGENCY DEPT.
Encounter Date: 11/1/2020    ED Physician Progress Notes        Physician Note:   Assumed care of patient at 2200 pending CT results.    Summary of CT impressions:   No acute intrathoracic or abdominopelvic pathology.  Colonic diverticulosis without evidence of diverticulitis.  Acute nondisplaced fracture of the T6 spinous process.  Lytic lesion centered in the left L2 transverse process with suspected pathological fracture of the left transverse process at L2.  MRI of the lumbar spine without and with contrast is suggested for further evaluation.  Multilevel cervical spondylolysis with likely moderate canal stenosis at C3-4 and C5-6.  Mild multilevel lumbar spondylosis with no significant spinal canal stenosis.    I discussed these findings with the patient and his wife using a .  I discussed the case with neurosurgery.  No acute surgical intervention, recommending MRI lumbar spine.     MRI lumbar results reviewed with neurosurgery.   Patient is requiring multiple doses of IV pain medication and is not able to comfortably ambulate.   Admit for pain control.

## 2020-11-02 NOTE — PHARMACY MED REC
"Admission Medication Reconciliation - Pharmacy Consult Note    The home medication history was taken by Laure Mo, Pharmacy Technician. Based on information gathered and subsequent review by the clinical pharmacist, the items below may need attention.    You may go to "Admission" then "Reconcile Home Medications" tabs to review and/or act upon these items.    No issues noted with the medication reconciliation.    Please address this information as you see fit.  Feel free to contact us if you have any questions or require assistance.    Alyson Blake, PharmD, BCPS  [id number]     PTA Medications   Medication Sig    ibuprofen (ADVIL,MOTRIN) 200 MG tablet Take 400 mg by mouth every 6 (six) hours as needed (headache).    multivitamin (ONE DAILY MULTIVITAMIN) per tablet Take 1 tablet by mouth once daily.     .    .          "

## 2020-11-02 NOTE — ED NOTES
Pt transported via stretcher to room 46042 with tele box in place  Pt condition stable on transport, pt belongings are with pt and pt's wife notified of room number

## 2020-11-02 NOTE — CONSULTS
[facility]  Neurosurgery  Consult Note    Inpatient consult to Neurosurgery  Consult performed by: Bonifacio Alexandre MD  Consult ordered by: Laz Nickerson MD        Subjective:     Chief Complaint/Reason for Admission: tp and spinous process fx.     History of Present Illness: 71 y/o M  who had a tree fall on him ( was cutting trees)  found to have T6 spinous process fx and left L2 TP fx with adjacent possible lytic lesion. He denies weakness or change in sensation or difficulty with bowel or bladder control.     (Not in a hospital admission)      Review of patient's allergies indicates:  No Known Allergies    History reviewed. No pertinent past medical history.  Past Surgical History:   Procedure Laterality Date    HERNIA REPAIR       Family History     None        Tobacco Use    Smoking status: Current Every Day Smoker   Substance and Sexual Activity    Alcohol use: No    Drug use: Not on file    Sexual activity: Not on file     Review of Systems   Constitutional: Negative for activity change.   HENT: Negative for congestion.    Respiratory: Negative for apnea.    Genitourinary: Negative for difficulty urinating.   Musculoskeletal: Negative for arthralgias.     Objective:     Weight: 56.2 kg (124 lb)  Body mass index is 22.68 kg/m².  Vital Signs (Most Recent):  Temp: 98.5 °F (36.9 °C) (11/01/20 1815)  Pulse: 83 (11/01/20 2247)  Resp: 16 (11/01/20 1850)  BP: (!) 141/84 (11/01/20 2247)  SpO2: 99 % (11/01/20 2247) Vital Signs (24h Range):  Temp:  [98.5 °F (36.9 °C)] 98.5 °F (36.9 °C)  Pulse:  [] 83  Resp:  [16-18] 16  SpO2:  [98 %-100 %] 99 %  BP: (130-145)/() 141/84     Neurosurgery Physical Exam     Motor 5/5 SILT  No cervical TTP and   Full range of motion.    No garza's or clonus       Significant Labs:  Recent Labs   Lab 11/01/20 1850   *      K 4.0      CO2 21*   BUN 18   CREATININE 1.0   CALCIUM 9.0     Recent Labs   Lab 11/01/20 1850 11/01/20 1900    WBC 10.57  --    HGB 13.6*  --    HCT 41.2 40     --      Recent Labs   Lab 11/01/20  1850   INR 1.0     Microbiology Results (last 7 days)     ** No results found for the last 168 hours. **        All pertinent labs from the last 24 hours have been reviewed.    Significant Diagnostics:  I have reviewed and interpreted all pertinent imaging results/findings within the past 24 hours.    Acute nondisplaced fracture of the T6 spinous process. Lytic lesion centered in the left L2 transverse process with suspected pathological fracture of the left transverse process at L2.        Assessment/Plan:     Closed fracture of spinous process of thoracic vertebra with routine healing  Lance JESUS Bob  is a 70-year-old male with no significant medical history presenting via EMS for evaluation for back pain and left hip pain after a tree fell onto his back.  CT  With acute nondisplaced fracture of the T6 spinous process. Lytic lesion centered in the left L2 transverse process with suspected pathological fracture of the left transverse process at L2.  He is intact on exam.     No need for c collar.   MRI L spine with and without for further evaluation  of lytic lesion.   No NSGY intervention at this time.        NSGY update: MRI complete no sign of neoplastic component to suggest pathologic fx. Given amount of pain and STIR signal seen on L3 sup endplate, likely acute compression fracture. LSO brace ordrred for patient to wear when ambulating for pain control and support. No neurosurgical intervention as these are stable spine fractures.       Plan discussed with Dr. Alicia Alexandre MD  Neurosurgery  [facility]

## 2020-11-02 NOTE — PLAN OF CARE
Problem: Physical Therapy Goal  Goal: Physical Therapy Goal  Description: Goals to be met by: 2020     Patient will increase functional independence with mobility by performin. Supine to sit with Modified [place]  2. Sit to supine with Modified [place]  3. Sit to stand transfer with Modified [place]  4. Bed to chair transfer with Supervision using Rolling Walker  5. Gait  x 150 feet with Supervision using Rolling Walker.   6. Ascend/Descend 6 inch curb step with Stand-by Assistance using Rolling Walker.  7. Lower extremity exercise program x30 reps per handout, with independence    Outcome: Ongoing, Progressing       PT eval complete. Patient is safe to return home once medically ready and would benefit from home health physical therapy in order to improve safety and independence in the home environment.     Alejandra Caruso, PT, DPT  2020

## 2020-11-02 NOTE — PLAN OF CARE
[facility]    HOME HEALTH ORDERS  FACE TO FACE ENCOUNTER    Patient Name: Lance Bob  YOB: 1950    PCP: Primary Doctor No   PCP Address: None  PCP Phone Number: None  PCP Fax: None    Encounter Date: 11/02/2020    Admit to Home Health    Diagnoses:  Active Hospital Problems    Diagnosis  POA    *Traumatic rhabdomyolysis [T79.6XXA]  Yes    Left hip pain [M25.552]  Yes    Closed fracture of spinous process of thoracic vertebra with routine healing [S22.008D]  Not Applicable      Resolved Hospital Problems   No resolved problems to display.       No future appointments.        I have seen and examined this patient face to face today. My clinical findings that support the need for the home health skilled services and home bound status are the following:  Weakness/numbness causing balance and gait disturbance due to Fracture making it taxing to leave home.    Allergies:Review of patient's allergies indicates:  No Known Allergies    Diet: regular diet    Activities: activity as tolerated    Nursing:   SN to complete comprehensive assessment including routine vital signs. Instruct on disease process and s/s of complications to report to MD. Review/verify medication list sent home with the patient at time of discharge  and instruct patient/caregiver as needed. Frequency may be adjusted depending on start of care date.    Notify MD if SBP > 160 or < 90; DBP > 90 or < 50; HR > 120 or < 50; Temp > 101;      CONSULTS:    Physical Therapy to evaluate and treat. Evaluate for home safety and equipment needs; Establish/upgrade home exercise program. Perform / instruct on therapeutic exercises, gait training, transfer training, and Range of Motion.  Occupational Therapy to evaluate and treat. Evaluate home environment for safety and equipment needs. Perform/Instruct on transfers, ADL training, ROM, and therapeutic exercises.  Aide to provide assistance with personal care, ADLs, and  vital signs.    MISCELLANEOUS CARE:  N/A    WOUND CARE ORDERS  n/a      Medications: Review discharge medications with patient and family and provide education.      Current Discharge Medication List      START taking these medications    Details   HYDROcodone-acetaminophen (NORCO) 7.5-325 mg per tablet Take 1 tablet by mouth every 6 (six) hours as needed for Pain.  Qty: 28 tablet, Refills: 0    Comments: n/a       methocarbamoL (ROBAXIN) 750 MG Tab Take 1 tablet (750 mg total) by mouth every 8 (eight) hours as needed.  Qty: 30 tablet, Refills: 0         CONTINUE these medications which have NOT CHANGED    Details   ibuprofen (ADVIL,MOTRIN) 200 MG tablet Take 400 mg by mouth every 6 (six) hours as needed (headache).      multivitamin (ONE DAILY MULTIVITAMIN) per tablet Take 1 tablet by mouth once daily.             I certify that this patient is confined to his home and needs physical therapy and occupational therapy.

## 2020-11-02 NOTE — ED NOTES
Jesus in WAR room notified tele box [id number] was placed on pt  SR with heart rate 66 bpm was confirmed by Jesus

## 2020-11-02 NOTE — ED PROVIDER NOTES
Encounter Date: 11/1/2020       History     Chief Complaint   Patient presents with    Back Injury     he was chopping another tree when a tree split and fell onto his back. No LOC. 50mcg of Fentanyl given by EMS     HPI   Lance Bob  is a 70-year-old male with no significant medical history presenting via EMS for evaluation for back pain and left hip pain after a tree fell onto his back.  He was cutting a tree when a no other tree split and fell onto his back.  There was no loss of consciousness, he is complaining of 10 out 10 pain to his lower back and left hip.  Onset was sudden, occurred approximately 45 min prior to arrival.  He denies any head pain, but does endorse lower back pain and left hip pain.  Denies any chest pain, abdominal pain, fevers, chills, leg pain, lightheadedness or dizziness.  He was brought in via EMS on a spine board and a C-collar in place.  He is neurologically intact, denies any numbness or tingling.  No other aggravating or alleviating factors.    Prior medical history:  Denies any medical problems  Prior surgical history:  History of hernia repair  Family history:  No history of bleeding disorders or blood clots  Social history:  Daily smoker, tobacco abuse, 5-6 cigarettes daily; denies alcohol or illicit drug use  Medications:  Denies any medication use  Allergies:  Denies any medication or allergies    Review of patient's allergies indicates:  No Known Allergies  History reviewed. No pertinent past medical history.  Past Surgical History:   Procedure Laterality Date    HERNIA REPAIR       No family history on file.  Social History     Tobacco Use    Smoking status: Current Every Day Smoker   Substance Use Topics    Alcohol use: No    Drug use: Not on file     Review of Systems   Constitutional: Negative for chills, fatigue and fever.   HENT: Negative for congestion and sore throat.    Eyes: Negative for photophobia and visual disturbance.   Respiratory: Negative for  chest tightness and shortness of breath.    Cardiovascular: Negative for chest pain, palpitations and leg swelling.   Gastrointestinal: Negative for abdominal pain, nausea and vomiting.   Endocrine: Negative for polyphagia and polyuria.   Genitourinary: Negative for dysuria, flank pain and testicular pain.   Musculoskeletal: Positive for arthralgias, back pain, gait problem and myalgias. Negative for neck pain and neck stiffness.   Skin: Negative for color change and wound.   Allergic/Immunologic: Negative for immunocompromised state.   Neurological: Negative for seizures, facial asymmetry, speech difficulty and numbness.   Psychiatric/Behavioral: Negative for confusion and hallucinations. The patient is not nervous/anxious and is not hyperactive.        Physical Exam     Initial Vitals [11/01/20 1815]   BP Pulse Resp Temp SpO2   (!) 140/100 100 18 98.5 °F (36.9 °C) 100 %      MAP       --         Physical Exam    Nursing note and vitals reviewed.    Gen/Constitutional: Interactive.  Moderate emotional distress secondary to pain  Head: Normocephalic, Atraumatic  Neck: supple, no masses or LAD, no JVD; cervical collar in place  Eyes: PERRLA, conjunctiva clear  Ears, Nose and Throat: No rhinorrhea or stridor.  Cardiac: Reg Rhythm, No murmur  Pulmonary: CTA Bilat, no wheezes, rhonchi, rales.  GI: Abdomen soft, non-tender, non-distended; no rebound or guarding  : No CVA tenderness.  Spine:  Arrives on spine board, lumbar and thoracic tenderness with no bony step-offs, no midline spinal tenderness along the cervical spine.  Rectal tone intact.  Spinal precautions were maintained.  Musculoskeletal: Extremities warm, well perfused, no erythema, no edema  Left hip:  Pain with range of motion, pain with extension or flexion, 2+ distal pulses, sensory intact to light touch; pain along the greater trochanter  Skin: No rashes, cyanosis or jaundice  Neuro: Alert and Oriented x 3; No focal motor or sensory deficits.  2+ distal  pulses, 5/5 strength upper lower extremities  Psych: Normal affect      ED Course   Procedures  Labs Reviewed   CBC W/ AUTO DIFFERENTIAL - Abnormal; Notable for the following components:       Result Value    RBC 4.28 (*)     Hemoglobin 13.6 (*)     MCH 31.8 (*)     Immature Granulocytes 0.8 (*)     Gran # (ANC) 8.7 (*)     Immature Grans (Abs) 0.08 (*)     Gran % 82.2 (*)     Lymph % 9.4 (*)     All other components within normal limits   COMPREHENSIVE METABOLIC PANEL - Abnormal; Notable for the following components:    CO2 21 (*)     Glucose 136 (*)     AST 68 (*)     All other components within normal limits   URINALYSIS, REFLEX TO URINE CULTURE - Abnormal; Notable for the following components:    Occult Blood UA 3+ (*)     All other components within normal limits    Narrative:     Specimen Source->Urine   COMPREHENSIVE METABOLIC PANEL - Abnormal; Notable for the following components:    CO2 20 (*)     Calcium 8.4 (*)     Total Bilirubin 1.3 (*)      (*)      (*)     All other components within normal limits   CK - Abnormal; Notable for the following components:    CPK 46976 (*)     All other components within normal limits   CBC W/ AUTO DIFFERENTIAL - Abnormal; Notable for the following components:    RBC 4.38 (*)     Hemoglobin 13.7 (*)     MCH 31.3 (*)     Platelets 147 (*)     Lymph # 0.7 (*)     Gran % 75.9 (*)     Lymph % 12.4 (*)     All other components within normal limits   ISTAT PROCEDURE - Abnormal; Notable for the following components:    POC Glucose 136 (*)     All other components within normal limits   PROTIME-INR   URINALYSIS MICROSCOPIC    Narrative:     Specimen Source->Urine   MAGNESIUM   PHOSPHORUS   SARS-COV-2 RDRP GENE    Narrative:     This test utilizes isothermal nucleic acid amplification   technology to detect the SARS-CoV-2 RdRp nucleic acid segment.   The analytical sensitivity (limit of detection) is 125 genome   equivalents/mL.   A POSITIVE result implies infection  with the SARS-CoV-2 virus;   the patient is presumed to be contagious.     A NEGATIVE result means that SARS-CoV-2 nucleic acids are not   present above the limit of detection. A NEGATIVE result should be   treated as presumptive. It does not rule out the possibility of   COVID-19 and should not be the sole basis for treatment decisions.   If COVID-19 is strongly suspected based on clinical and exposure   history, re-testing using an alternate molecular assay should be   considered.   This test is only for use under the Food and Drug   Administration s Emergency Use Authorization (EUA).   Commercial kits are provided by [employer].   Performance characteristics of the EUA have been independently   verified by [facility] Department of   Pathology and Laboratory Medicine.   _________________________________________________________________   The authorized Fact Sheet for Healthcare Providers and the authorized Fact   Sheet for Patients of the ID NOW COVID-19 are available on the FDA   website:     https://www.fda.gov/media/367310/download  https://www.fda.gov/media/473468/download         ISTAT CHEM8     EKG Readings: (Independently Interpreted)   Initial Reading: No STEMI. Rhythm: Normal Sinus Rhythm. Heart Rate: 87. Ectopy: No Ectopy. Conduction: Normal. ST Segments: Normal ST Segments. T Waves: Normal. Axis: Normal.     ECG Results          EKG 12-lead (In process)  Result time 11/02/20 07:52:19    In process by Interface, Lab In [facility] (11/02/20 07:52:19)                 Narrative:    Test Reason : M54.9,    Vent. Rate : 087 BPM     Atrial Rate : 087 BPM     P-R Int : 132 ms          QRS Dur : 072 ms      QT Int : 344 ms       P-R-T Axes : 072 065 065 degrees     QTc Int : 413 ms    Normal sinus rhythm  Normal ECG  No previous ECGs available    Referred By: [email or web address]   SELF           Confirmed By:                             Imaging Results           MRI Lumbar Spine W WO Cont (Final result)   Result time 11/02/20 01:54:30    Final result by Nuria Esparza MD (11/02/20 01:54:30)                 Impression:      Superior endplate mild compression fracture of the L3 vertebra with mild wedging to the right.  No evidence of fracture retropulsion, canal hematoma or vertebral body listhesis is seen as result.  There is no visible extension a fracture into the posterior elements.    The left transverse process fracture at L2 is not very well demonstrated with MRI without fat saturation in the axial plane.  There is no significant marrow enhancing lesions to support a pathologic fracture related to a mass/neoplasm with certainty.  The fracture could explained by trauma particularly with an adjacent vertebral body compression.  Follow-up is suggested and additional sequences with fat saturation suggested at the time of follow-up.    No suspicious marrow lesions or enhancing lesions in the vertebral bodies are noted as imaged.    Multilevel spondylosis without canal or significant neural foraminal stenosis.  Detailed findings are described above.    Paraspinous pelvic floor muscular edematous changes on the left are likely post-traumatic on the left and are partially visualized.    Multiple renal cystic foci with several incompletely imaged.  These findings are better characterized on the abdomen pelvis CT 11/01/2020.    This report was flagged in Epic as abnormal.      Electronically signed by: Nuria Esparza  Date:    11/02/2020  Time:    01:54             Narrative:    EXAMINATION:  MRI LUMBAR SPINE W WO CONTRAST    CLINICAL HISTORY:  L/S-spine fracture, pathological; patient post trauma and fall with back injury and L2 left transverse process fracture.    TECHNIQUE:  Multiplanar, multisequence MR images were acquired from the thoracolumbar junction to the sacrum without and with IV injection of 6 cc of Gadavist contrast.    COMPARISON:  CTA of the lumbar spine 11/01/2020    FINDINGS:  Alignment: There is  no subluxation.    Vertebrae: Note is made of a superior endplate compression fracture of the L3 vertebral body more pronounced on the right side extending towards the base of the right pedicle.  There is right-sided mild wedge deformity and cortical irregularity with relative levoscoliosis noted on the prior CT as result.  There is no retropulsion into the spinal canal or significant listhesis.    There is no evidence of fracture extension into the right pedicle or posterior elements of the vertebra L3.    The L2 transverse process fracture as noted by CT is seen to a limited degree due to the lack of fat saturation on the axial series but it is present with expected enhancement of the surrounding soft tissues related to edema/posttraumatic changes.  The presence of a definitive soft tissue enhancing mass to suggest pathologic fracture is not confirmed as imaged.  There is also limited visualization of the transverse process fracture on the left on sagittal imaging with fat saturation limiting characterization.    There are no other marrow signal abnormalities throughout the lumbar vertebrae.    Cord: Normal signal in the visualized distal spinal cord is noted.  The conus terminates at the T12-L1 disc level    Degenerative findings:    T12-L1: No dorsal focal disc abnormality or stenosis.    L1-L2: There is moderate spondylosis with broad-based small dorsal disc protrusion more pronounced in the left foraminal region.  There is no canal stenosis or significant neural foraminal stenosis.    L2-L3: B mild-to-moderate spondylosis with broad-based small dorsal annular bulge/protrusion is noted.  There is no canal or neural foraminal stenosis.  Facet hypertrophic changes are present.    L3-L4: There is minor dorsal annular bulging without significant disc protrusion or canal or neural foraminal stenosis.  Mild facet hypertrophy bilaterally is noted.    L4-L5: There is broad-based small disc protrusion in the canal which  extends to the neural foramina bilaterally.  There is no canal stenosis.  Facet and ligamentous mild bilateral hypertrophy is noted contributing to mild bilateral inferior neural foraminal stenosis.    L5-S1: There is moderate spondylosis and a small broad-based dorsal disc protrusion.  There is no canal stenosis or significant neural foraminal stenosis.  Mild to moderate bilateral facet hypertrophic changes are present.    Sacroiliac joint degenerative changes are seen.    Paraspinous soft tissues and musculature are remarkable for muscular edematous changes in the left pelvic floor musculature which is only seen on a few slices of the sagittal series 7 images 11 through 16.    Multiple cystic foci in the kidneys with dominant lesion on the right noted with several incompletely visualized.                                CT Cervical Spine Without Contrast (Final result)  Result time 11/01/20 22:09:59    Final result by Sudhakar Weiner MD (11/01/20 22:09:59)                 Impression:      Acute nondisplaced fracture of the T6 spinous process.    Lytic lesion centered in the left L2 transverse process with suspected pathological fracture of the left transverse process at L2.  MRI of the lumbar spine without and with contrast is suggested for further evaluation.    Multilevel cervical spondylolysis with likely moderate canal stenosis at C3-4 and C5-6.    Mild multilevel lumbar spondylosis with no significant spinal canal stenosis.    This report was flagged in Epic as abnormal.    Electronically signed by resident: Mukesh Mtz  Date:    11/01/2020  Time:    21:29    Electronically signed by: Sudhakar Weiner MD  Date:    11/01/2020  Time:    22:09             Narrative:    EXAMINATION:  CT CERVICAL SPINE WITHOUT CONTRAST; CT LUMBAR SPINE WITHOUT CONTRAST; CT THORACIC SPINE WITHOUT CONTRAST    CLINICAL HISTORY:  Neck trauma (Age => 64y);; Back pain or radiculopathy, trauma;; Mid-back trauma;    TECHNIQUE:  Multiplanar  CT images of the cervical, thoracic, and lumbar spine were performed without the administration of contrast.    COMPARISON:  CT chest abdomen pelvis 11/01/2020 and CT scan abdomen pelvis dated 05/21/2015.    FINDINGS:  CERVICAL    Alignment: Normal.    Vertebrae: Vertebral body heights are well maintained.  No evidence of fractures.    Skull base and craniocervical junction: Unremarkable    Multilevel cervical spondylolysis with subchondral cystic change, endplate erosion, uncovertebral spurring, facet arthropathy, and posterior osteophyte disc complexes.  Limited evaluation of intraspinal compartment reveals likely moderate canal stenosis at C3-4 and C5-6.  Variable degrees of foraminal narrowing    THORACIC    Normal kyphotic alignment of the thoracic spine.  Mild S-shaped scoliosis of the thoracolumbar spine.    Mild inferior endplate irregularity at T5 through T10, likely Schmorl's nodes.  Vertebral body heights are otherwise well maintained.    Acute nondisplaced fracture of the T6 spinous process (series 602, image 61).    No significant spinal canal stenosis or neural foraminal narrowing.    LUMBAR    Alignment: Normal.    Vertebral body heights are well maintained.  There is desiccation of the discs at L4-5 and L5-S1 consistent with degenerative change.  There is a lytic lesion centered in the left transverse process at L2.  There is suspected pathological fracture of the left L2 transverse process.    Degenerative findings:    Mild multilevel lumbar spondylosis with mild facet arthropathy, anterior osteophytes, and posterior disc bulges.  No significant spinal canal stenosis.  Variable neural foraminal narrowing    Paraspinal muscles & soft tissues: See same day chest abdomen pelvis report for detailed organ evaluation of the thorax and abdominopelvic compartments.                                CT Thoracic Spine Without Contrast (Final result)  Result time 11/01/20 22:09:59    Final result by Sudhakar Weiner  MD (11/01/20 22:09:59)                 Impression:      Acute nondisplaced fracture of the T6 spinous process.    Lytic lesion centered in the left L2 transverse process with suspected pathological fracture of the left transverse process at L2.  MRI of the lumbar spine without and with contrast is suggested for further evaluation.    Multilevel cervical spondylolysis with likely moderate canal stenosis at C3-4 and C5-6.    Mild multilevel lumbar spondylosis with no significant spinal canal stenosis.    This report was flagged in Epic as abnormal.    Electronically signed by resident: Mukesh Mtz  Date:    11/01/2020  Time:    21:29    Electronically signed by: Sudhakar Weiner MD  Date:    11/01/2020  Time:    22:09             Narrative:    EXAMINATION:  CT CERVICAL SPINE WITHOUT CONTRAST; CT LUMBAR SPINE WITHOUT CONTRAST; CT THORACIC SPINE WITHOUT CONTRAST    CLINICAL HISTORY:  Neck trauma (Age => 64y);; Back pain or radiculopathy, trauma;; Mid-back trauma;    TECHNIQUE:  Multiplanar CT images of the cervical, thoracic, and lumbar spine were performed without the administration of contrast.    COMPARISON:  CT chest abdomen pelvis 11/01/2020 and CT scan abdomen pelvis dated 05/21/2015.    FINDINGS:  CERVICAL    Alignment: Normal.    Vertebrae: Vertebral body heights are well maintained.  No evidence of fractures.    Skull base and craniocervical junction: Unremarkable    Multilevel cervical spondylolysis with subchondral cystic change, endplate erosion, uncovertebral spurring, facet arthropathy, and posterior osteophyte disc complexes.  Limited evaluation of intraspinal compartment reveals likely moderate canal stenosis at C3-4 and C5-6.  Variable degrees of foraminal narrowing    THORACIC    Normal kyphotic alignment of the thoracic spine.  Mild S-shaped scoliosis of the thoracolumbar spine.    Mild inferior endplate irregularity at T5 through T10, likely Schmorl's nodes.  Vertebral body heights are otherwise  well maintained.    Acute nondisplaced fracture of the T6 spinous process (series 602, image 61).    No significant spinal canal stenosis or neural foraminal narrowing.    LUMBAR    Alignment: Normal.    Vertebral body heights are well maintained.  There is desiccation of the discs at L4-5 and L5-S1 consistent with degenerative change.  There is a lytic lesion centered in the left transverse process at L2.  There is suspected pathological fracture of the left L2 transverse process.    Degenerative findings:    Mild multilevel lumbar spondylosis with mild facet arthropathy, anterior osteophytes, and posterior disc bulges.  No significant spinal canal stenosis.  Variable neural foraminal narrowing    Paraspinal muscles & soft tissues: See same day chest abdomen pelvis report for detailed organ evaluation of the thorax and abdominopelvic compartments.                                CT Lumbar Spine Without Contrast (Final result)  Result time 11/01/20 22:09:59    Final result by Sudhakar Weiner MD (11/01/20 22:09:59)                 Impression:      Acute nondisplaced fracture of the T6 spinous process.    Lytic lesion centered in the left L2 transverse process with suspected pathological fracture of the left transverse process at L2.  MRI of the lumbar spine without and with contrast is suggested for further evaluation.    Multilevel cervical spondylolysis with likely moderate canal stenosis at C3-4 and C5-6.    Mild multilevel lumbar spondylosis with no significant spinal canal stenosis.    This report was flagged in Epic as abnormal.    Electronically signed by resident: Mukesh Mtz  Date:    11/01/2020  Time:    21:29    Electronically signed by: Sudhakar Weiner MD  Date:    11/01/2020  Time:    22:09             Narrative:    EXAMINATION:  CT CERVICAL SPINE WITHOUT CONTRAST; CT LUMBAR SPINE WITHOUT CONTRAST; CT THORACIC SPINE WITHOUT CONTRAST    CLINICAL HISTORY:  Neck trauma (Age => 64y);; Back pain or  radiculopathy, trauma;; Mid-back trauma;    TECHNIQUE:  Multiplanar CT images of the cervical, thoracic, and lumbar spine were performed without the administration of contrast.    COMPARISON:  CT chest abdomen pelvis 11/01/2020 and CT scan abdomen pelvis dated 05/21/2015.    FINDINGS:  CERVICAL    Alignment: Normal.    Vertebrae: Vertebral body heights are well maintained.  No evidence of fractures.    Skull base and craniocervical junction: Unremarkable    Multilevel cervical spondylolysis with subchondral cystic change, endplate erosion, uncovertebral spurring, facet arthropathy, and posterior osteophyte disc complexes.  Limited evaluation of intraspinal compartment reveals likely moderate canal stenosis at C3-4 and C5-6.  Variable degrees of foraminal narrowing    THORACIC    Normal kyphotic alignment of the thoracic spine.  Mild S-shaped scoliosis of the thoracolumbar spine.    Mild inferior endplate irregularity at T5 through T10, likely Schmorl's nodes.  Vertebral body heights are otherwise well maintained.    Acute nondisplaced fracture of the T6 spinous process (series 602, image 61).    No significant spinal canal stenosis or neural foraminal narrowing.    LUMBAR    Alignment: Normal.    Vertebral body heights are well maintained.  There is desiccation of the discs at L4-5 and L5-S1 consistent with degenerative change.  There is a lytic lesion centered in the left transverse process at L2.  There is suspected pathological fracture of the left L2 transverse process.    Degenerative findings:    Mild multilevel lumbar spondylosis with mild facet arthropathy, anterior osteophytes, and posterior disc bulges.  No significant spinal canal stenosis.  Variable neural foraminal narrowing    Paraspinal muscles & soft tissues: See same day chest abdomen pelvis report for detailed organ evaluation of the thorax and abdominopelvic compartments.                               CT Chest Abdomen Pelvis With Contrast (Final  result)  Result time 11/01/20 22:16:11    Final result by Abel Salas MD (11/01/20 22:16:11)                 Impression:      No acute intrathoracic or abdominopelvic pathology.    Colonic diverticulosis without evidence of diverticulitis.    Other stable findings as above.    Electronically signed by resident: Mukesh Mtz  Date:    11/01/2020  Time:    21:18    Electronically signed by: Abel Salas  Date:    11/01/2020  Time:    22:16             Narrative:    EXAMINATION:  CT CHEST ABDOMEN PELVIS WITH CONTRAST (XPD)    CLINICAL HISTORY:  Chest-abdomen-pelvis trauma, blunt;    TECHNIQUE:  The patient was surveyed from the lung apices through the pelvis after the administration of 75 cc Omni 350 IV contrast..  Data was reconstructed for coronal, sagittal, and axial images.    COMPARISON:  CT abdomen pelvis 05/21/2015    CT chest 11/06/2018    FINDINGS:  CHEST:    Neck and thoracic soft tissues: unremarkable.    Aorta:A left sided aortic arch with 3 branch vessels.  The thoracic aorta maintains normal caliber, contour, and course.  No significant atherosclerotic calcification.    Heart: The heart is not enlarged. No pericardial effusion .    Airways: The trachea is midline and proximal airways are patent.    Lungs/Pleura: Lungs are symmetrically expanded.  No evidence of consolidation, mass, significant pleural thickening, or pleural fluid.    Hilum/Mediastinum: no axillary or mediastinal lymph node enlargement.    Esophagus: The esophagus maintains a normal course and caliber.    ABDOMEN/PELVIS:    Liver: The liver is normal in size and attenuation.  No focal hepatic abnormality is seen.  Portal veins are patent.    Gallbladder/Biliary System: the gallbladder is unremarkable.  No intrahepatic or extrahepatic biliary ductal dilatation is noted.    Stomach: Unremarkable    Spleen: Unremarkable    Pancreas: Unremarkable    Adrenal glands: Unremarkable    Renal and Urinary system: Right kidney appears  slightly atrophic when compared to left.  Stable appearance of bilateral simple renal cysts, largest of which measures 3.9 cm on the right kidney.  No evidence of renal stones.Kidneys concentrate contrast appropriately.  No hydronephrosis.  The ureters appear normal in course and caliber without evidence of ureteral dilatation. Urinary bladder wall is thickened likely due to chronic outlet obstruction.    Reproductive: Prostatomegaly    Bowel: The visualized loops of small and large bowel show no evidence of obstruction or inflammation.  Appendix is visualized without evidence of obstruction or surrounding inflammatory change.  There scattered colonic diverticuli.    Peritoneum: No ascites, free fluid, or intraperitoneal free air.    Lymph Nodes: No pathologic chase enlargement in the abdomen or pelvis.    Aorta: the abdominal aorta is normal in course and caliber.  Mild calcific and non-calcific atherosclerotic plaque.    Bones: Degenerative changes of the spine.  No evidence of acute fracture or osseus destructive process.    Soft tissues: unremarkable.                               X-Ray Femur AP/LAT Left (Final result)  Result time 11/01/20 19:13:15    Final result by Joaquín Rebolledo MD (11/01/20 19:13:15)                 Impression:      1. No acute displaced fracture or dislocation of the pelvis or left femur.      Electronically signed by: Joaquín Rebolledo MD  Date:    11/01/2020  Time:    19:13             Narrative:    EXAMINATION:  XR PELVIS ROUTINE AP; XR FEMUR 2 VIEW LEFT    CLINICAL HISTORY:  fall;  Unspecified fall, initial encounter    TECHNIQUE:  AP view of the pelvis was performed.  Four views left femur.    COMPARISON:  CT 05/21/2015    FINDINGS:  Single-view pelvis, four views left femur.    The bilateral sacroiliac joints are intact.  The pubic symphysis is intact.  The bilateral femoroacetabular joints are intact.  There is osteopenia.  There is remote irregularly healed injury of the mid  aspect of the left femoral diaphysis.  Prominent degenerative changes are noted of the knee.  No large knee joint effusion.                               X-Ray Pelvis Routine AP (Final result)  Result time 11/01/20 19:13:15   Procedure changed from X-Ray Hip 2 View Left     Final result by Joaquín Rebolledo MD (11/01/20 19:13:15)                 Impression:      1. No acute displaced fracture or dislocation of the pelvis or left femur.      Electronically signed by: Joaquín Rebolledo MD  Date:    11/01/2020  Time:    19:13             Narrative:    EXAMINATION:  XR PELVIS ROUTINE AP; XR FEMUR 2 VIEW LEFT    CLINICAL HISTORY:  fall;  Unspecified fall, initial encounter    TECHNIQUE:  AP view of the pelvis was performed.  Four views left femur.    COMPARISON:  CT 05/21/2015    FINDINGS:  Single-view pelvis, four views left femur.    The bilateral sacroiliac joints are intact.  The pubic symphysis is intact.  The bilateral femoroacetabular joints are intact.  There is osteopenia.  There is remote irregularly healed injury of the mid aspect of the left femoral diaphysis.  Prominent degenerative changes are noted of the knee.  No large knee joint effusion.                              X-Rays:   Independently Interpreted Readings:   Other Readings:  X-ray of the left femur and pelvis:  No acute fracture or dislocation on my read.    Medical Decision Making:   History:   I obtained history from: EMS provider.       <> Summary of History: Arrives via EMS on spine board and C-collar in place  Old Medical Records: I decided to obtain old medical records.  Initial Assessment:   Lance Bob  is a 70-year-old male with no significant medical history presenting via EMS for evaluation for back pain and left hip pain after a tree fell onto his back  Differential Diagnosis:   Fracture, dislocation, spinal injury, femur fracture, sprain, strain, contusion  Independently Interpreted Test(s):   I have ordered and independently  interpreted X-rays - see prior notes.  I have ordered and independently interpreted EKG Reading(s) - see prior notes  Clinical Tests:   Lab Tests: Ordered and Reviewed  Radiological Study: Ordered and Reviewed  Medical Tests: Ordered and Reviewed    Emergent evaluation of patient presenting as a trauma evaluation after sustaining a tree falling onto his back.  He is afebrile vital signs are stable.  He is in 10/10 pain to his lower back extending to the left hip.  He is brought on a spine board and a C-collar in place.  Using spinal precautions, the patient was transferred to a bed and evaluated using a primary and secondary survey.  Initial findings with tenderness along his lower thoracic and lumbar spine extending to the left pelvis and hip.  He is neurovascular intact, strength 5/5, with good rectal tone.  Pain was initially managed with IV fentanyl via EMS transfer and then 4 mg of morphine IV.  X-rays of the left hip and pelvis were obtained which do not show any fracture or dislocation.  Patient was kept in spinal precautions and a C-collar in place until radiographic evaluation.  Positive nexus criteria given acute lumbar and hip pain.  ECG was obtained with no signs of ischemia or STEMI on my read.  He was placed on cardiac and telemetry monitoring as he was given IV opiate pain medication.  CT lumbar, thoracic and cervical spine along with CT chest abdomen pelvis were obtained given mechanism of injury and pain pattern.  At the time of sign-out, the patient still had remaining CT imaging pending review and re-evaluation.  I suspect given his pain pattern, mechanism injury that he will have findings positive on his imaging for spinal fracture.  Continue spinal precautions, in case was discussed with oncoming ED physician, Dr. Nickerson with final disposition pending.     Complexity:  High risk-level 5                           Clinical Impression:     ICD-10-CM ICD-9-CM   1. Left hip pain  M25.552 719.45   2.  Back pain  M54.9 724.5   3. Fall  W19.XXXA E888.9   4. Lumbar spine pain  M54.5 724.2   5. Acute low back pain due to trauma  M54.5 724.2    G89.11 338.11                      Disposition:   Disposition: Admitted  Condition: Fair     ED Disposition Condition    Admit              Juan Mcintyre DO, FAAEM  Emergency Staff Physician   Dept of Emergency Medicine   [facility]  Spectralink: [id number]                 Juan Mcintyre DO  11/02/20 1146

## 2020-11-02 NOTE — ED TRIAGE NOTES
Patient identifiers for Lance Bob 70 y.o. male checked and correct.  Chief Complaint   Patient presents with    Back Injury     he was chopping another tree when a tree split and fell onto his back. No LOC. 50mcg of Fentanyl given by EMS     No past medical history on file.  Allergies reported: Review of patient's allergies indicates:  No Known Allergies      LOC: The patient is awake, alert, and oriented to place, time, situation. Affect is appropriate.  Speech is appropriate and clear.     APPEARANCE: Patient resting comfortably in no acute distress.  Patient is clean and well groomed.    SKIN: The skin is warm and dry; color consistent with ethnicity.  Patient has normal skin turgor and moist mucus membranes.  Skin intact; no breakdown or bruising noted.     MUSCULOSKELETAL: Patient moving upper and lower extremities without difficulty.  Denies weakness. Back pain. Pt in C collar    RESPIRATORY: Airway is open and patent. Respirations spontaneous, even, easy, and non-labored.  Patient has a normal effort and rate.  No accessory muscle use noted. Denies cough.     CARDIAC:  Normal rhythm and rate noted.  No peripheral edema noted. No complaints of chest pain.      ABDOMEN: Soft and non tender to palpation.  No distention noted.     NEUROLOGIC: Eyes open spontaneously.  Behavior appropriate to situation.  Follows commands; facial expression symmetrical.  Purposeful motor response noted; normal sensation in all extremities.

## 2020-11-02 NOTE — ASSESSMENT & PLAN NOTE
71 y/o M  who had a tree fall on him ( was cutting trees)  found to have T6 spinous process fx and left L2 TP fx with no neurological deficits. Unable to walk due to pain.  CT findings of T6 and L2 fractures, with possible lytic lesion at L2  MRI to evaluate lytic lesion - The presence of a definitive soft tissue enhancing mass to suggest pathologic fracture is not confirmed as imaged    Plan  - pain management Norco 7.5 Q6 PRN, morphine 4mg IV Q6 for breakthrough, methocarbamol 750 q8 PRN, tylenol PRN, lidocaine patch  - narcan reversal ordered  - PT/OT evaluation  - neurosurgery consulted in ED, no intervention at this time.  - q4 neuro checks  - PT/OT  - fall precautions

## 2020-11-02 NOTE — HPI
70 year old male with no known significant medical history presents after a tree fell on to his back. He is complaining of back pain and hip pain which is 10/10. This began immediately after the tree fell onto him. He denies head trama, loss of consciousness. Denies any infective symptoms. Denies numbness, tingling, loss of bladder or bowel function.   In the ED he was hemodynamically stable and afebrile. Imaging revealed fracture of T6 and L2 fractures. Neurosurgery was consulted and have no neurosurgical intervention planned at this time. He was admitted to hospital medicine for pain management.

## 2020-11-02 NOTE — HOSPITAL COURSE
71 yo M admitted for l2 and l3 fracture after a tree fell on his back. Neurosurgery consulted, deferred surgical tx, so treated with PT, brace, and pain control. Found to have rhabdo, so treated with aggressive hydration, trending CPK. CPK improved after one day of hydration. Will discharge with home health for PT/OT and neurosurgery follow up in 6 weeks. Patient given note for work with lifting restrictions.

## 2020-11-02 NOTE — SUBJECTIVE & OBJECTIVE
(Not in a hospital admission)      Review of patient's allergies indicates:  No Known Allergies    History reviewed. No pertinent past medical history.  Past Surgical History:   Procedure Laterality Date    HERNIA REPAIR       Family History     None        Tobacco Use    Smoking status: Current Every Day Smoker   Substance and Sexual Activity    Alcohol use: No    Drug use: Not on file    Sexual activity: Not on file     Review of Systems   Constitutional: Negative for activity change.   HENT: Negative for congestion.    Respiratory: Negative for apnea.    Genitourinary: Negative for difficulty urinating.   Musculoskeletal: Negative for arthralgias.     Objective:     Weight: 56.2 kg (124 lb)  Body mass index is 22.68 kg/m².  Vital Signs (Most Recent):  Temp: 98.5 °F (36.9 °C) (11/01/20 1815)  Pulse: 83 (11/01/20 2247)  Resp: 16 (11/01/20 1850)  BP: (!) 141/84 (11/01/20 2247)  SpO2: 99 % (11/01/20 2247) Vital Signs (24h Range):  Temp:  [98.5 °F (36.9 °C)] 98.5 °F (36.9 °C)  Pulse:  [] 83  Resp:  [16-18] 16  SpO2:  [98 %-100 %] 99 %  BP: (130-145)/() 141/84                          Neurosurgery Physical Exam     Motor 5/5 SILT  No cervical TTP and   Full range of motion.    No garza's or clonus       Significant Labs:  Recent Labs   Lab 11/01/20 1850   *      K 4.0      CO2 21*   BUN 18   CREATININE 1.0   CALCIUM 9.0     Recent Labs   Lab 11/01/20 1850 11/01/20  1900   WBC 10.57  --    HGB 13.6*  --    HCT 41.2 40     --      Recent Labs   Lab 11/01/20 1850   INR 1.0     Microbiology Results (last 7 days)     ** No results found for the last 168 hours. **        All pertinent labs from the last 24 hours have been reviewed.    Significant Diagnostics:  I have reviewed and interpreted all pertinent imaging results/findings within the past 24 hours.    Acute nondisplaced fracture of the T6 spinous process. Lytic lesion centered in the left L2 transverse process with  suspected pathological fracture of the left transverse process at L2.
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
## 2020-11-03 PROBLEM — E55.9 VITAMIN D DEFICIENCY: Status: ACTIVE | Noted: 2020-11-03

## 2020-11-03 PROBLEM — Z75.8 DISCHARGE PLANNING ISSUES: Status: ACTIVE | Noted: 2020-11-03

## 2020-11-03 PROBLEM — S32.030A CLOSED COMPRESSION FRACTURE OF L3 LUMBAR VERTEBRA, INITIAL ENCOUNTER: Status: ACTIVE | Noted: 2020-11-03

## 2020-11-03 PROBLEM — Z75.8 DISCHARGE PLANNING ISSUES: Status: RESOLVED | Noted: 2020-11-03 | Resolved: 2020-11-03

## 2020-11-03 PROBLEM — T79.6XXA TRAUMATIC RHABDOMYOLYSIS: Status: RESOLVED | Noted: 2020-11-02 | Resolved: 2020-11-03

## 2020-11-03 LAB
25(OH)D3+25(OH)D2 SERPL-MCNC: 19 NG/ML (ref 30–96)
ALBUMIN SERPL BCP-MCNC: 3.4 G/DL (ref 3.5–5.2)
ALP SERPL-CCNC: 61 U/L (ref 55–135)
ALT SERPL W/O P-5'-P-CCNC: 105 U/L (ref 10–44)
ANION GAP SERPL CALC-SCNC: 8 MMOL/L (ref 8–16)
AST SERPL-CCNC: 283 U/L (ref 10–40)
BASOPHILS # BLD AUTO: 0.02 K/UL (ref 0–0.2)
BASOPHILS NFR BLD: 0.4 % (ref 0–1.9)
BILIRUB SERPL-MCNC: 1.7 MG/DL (ref 0.1–1)
BUN SERPL-MCNC: 11 MG/DL (ref 8–23)
CA-I BLDV-SCNC: 1.22 MMOL/L (ref 1.06–1.42)
CALCIUM SERPL-MCNC: 8.7 MG/DL (ref 8.7–10.5)
CHLORIDE SERPL-SCNC: 101 MMOL/L (ref 95–110)
CK SERPL-CCNC: 9257 U/L (ref 20–200)
CO2 SERPL-SCNC: 28 MMOL/L (ref 23–29)
CREAT SERPL-MCNC: 0.8 MG/DL (ref 0.5–1.4)
DIFFERENTIAL METHOD: ABNORMAL
EOSINOPHIL # BLD AUTO: 0.2 K/UL (ref 0–0.5)
EOSINOPHIL NFR BLD: 3.9 % (ref 0–8)
ERYTHROCYTE [DISTWIDTH] IN BLOOD BY AUTOMATED COUNT: 13.2 % (ref 11.5–14.5)
EST. GFR  (AFRICAN AMERICAN): >60 ML/MIN/1.73 M^2
EST. GFR  (NON AFRICAN AMERICAN): >60 ML/MIN/1.73 M^2
GLUCOSE SERPL-MCNC: 82 MG/DL (ref 70–110)
HCT VFR BLD AUTO: 38.9 % (ref 40–54)
HGB BLD-MCNC: 12.8 G/DL (ref 14–18)
IMM GRANULOCYTES # BLD AUTO: 0.01 K/UL (ref 0–0.04)
IMM GRANULOCYTES NFR BLD AUTO: 0.2 % (ref 0–0.5)
LYMPHOCYTES # BLD AUTO: 1 K/UL (ref 1–4.8)
LYMPHOCYTES NFR BLD: 16.7 % (ref 18–48)
MCH RBC QN AUTO: 32 PG (ref 27–31)
MCHC RBC AUTO-ENTMCNC: 32.9 G/DL (ref 32–36)
MCV RBC AUTO: 97 FL (ref 82–98)
MONOCYTES # BLD AUTO: 0.6 K/UL (ref 0.3–1)
MONOCYTES NFR BLD: 10.9 % (ref 4–15)
NEUTROPHILS # BLD AUTO: 3.9 K/UL (ref 1.8–7.7)
NEUTROPHILS NFR BLD: 67.9 % (ref 38–73)
NRBC BLD-RTO: 0 /100 WBC
PHOSPHATE SERPL-MCNC: 2.5 MG/DL (ref 2.7–4.5)
PLATELET # BLD AUTO: 131 K/UL (ref 150–350)
PMV BLD AUTO: 11.5 FL (ref 9.2–12.9)
POTASSIUM SERPL-SCNC: 4 MMOL/L (ref 3.5–5.1)
POTASSIUM SERPL-SCNC: 4.7 MMOL/L (ref 3.5–5.1)
PROT SERPL-MCNC: 6 G/DL (ref 6–8.4)
RBC # BLD AUTO: 4 M/UL (ref 4.6–6.2)
SODIUM SERPL-SCNC: 137 MMOL/L (ref 136–145)
WBC # BLD AUTO: 5.69 K/UL (ref 3.9–12.7)

## 2020-11-03 PROCEDURE — 25000003 PHARM REV CODE 250: Performed by: STUDENT IN AN ORGANIZED HEALTH CARE EDUCATION/TRAINING PROGRAM

## 2020-11-03 PROCEDURE — 99232 SBSQ HOSP IP/OBS MODERATE 35: CPT | Mod: GC,,, | Performed by: HOSPITALIST

## 2020-11-03 PROCEDURE — 80053 COMPREHEN METABOLIC PANEL: CPT

## 2020-11-03 PROCEDURE — 63600175 PHARM REV CODE 636 W HCPCS: Performed by: STUDENT IN AN ORGANIZED HEALTH CARE EDUCATION/TRAINING PROGRAM

## 2020-11-03 PROCEDURE — 97535 SELF CARE MNGMENT TRAINING: CPT

## 2020-11-03 PROCEDURE — 97530 THERAPEUTIC ACTIVITIES: CPT

## 2020-11-03 PROCEDURE — 11000001 HC ACUTE MED/SURG PRIVATE ROOM

## 2020-11-03 PROCEDURE — 82306 VITAMIN D 25 HYDROXY: CPT

## 2020-11-03 PROCEDURE — 82550 ASSAY OF CK (CPK): CPT

## 2020-11-03 PROCEDURE — 84100 ASSAY OF PHOSPHORUS: CPT

## 2020-11-03 PROCEDURE — 85025 COMPLETE CBC W/AUTO DIFF WBC: CPT

## 2020-11-03 PROCEDURE — 94761 N-INVAS EAR/PLS OXIMETRY MLT: CPT

## 2020-11-03 PROCEDURE — 36415 COLL VENOUS BLD VENIPUNCTURE: CPT

## 2020-11-03 PROCEDURE — 99900035 HC TECH TIME PER 15 MIN (STAT)

## 2020-11-03 PROCEDURE — 82330 ASSAY OF CALCIUM: CPT

## 2020-11-03 PROCEDURE — 84132 ASSAY OF SERUM POTASSIUM: CPT

## 2020-11-03 PROCEDURE — 99232 PR SUBSEQUENT HOSPITAL CARE,LEVL II: ICD-10-PCS | Mod: GC,,, | Performed by: HOSPITALIST

## 2020-11-03 RX ORDER — CHOLECALCIFEROL (VITAMIN D3) 25 MCG
1000 TABLET ORAL DAILY
Status: DISCONTINUED | OUTPATIENT
Start: 2020-11-03 | End: 2020-11-03

## 2020-11-03 RX ORDER — SODIUM CHLORIDE, SODIUM LACTATE, POTASSIUM CHLORIDE, CALCIUM CHLORIDE 600; 310; 30; 20 MG/100ML; MG/100ML; MG/100ML; MG/100ML
INJECTION, SOLUTION INTRAVENOUS CONTINUOUS
Status: DISCONTINUED | OUTPATIENT
Start: 2020-11-03 | End: 2020-11-03

## 2020-11-03 RX ORDER — SODIUM CHLORIDE, SODIUM LACTATE, POTASSIUM CHLORIDE, CALCIUM CHLORIDE 600; 310; 30; 20 MG/100ML; MG/100ML; MG/100ML; MG/100ML
INJECTION, SOLUTION INTRAVENOUS CONTINUOUS
Status: ACTIVE | OUTPATIENT
Start: 2020-11-03 | End: 2020-11-04

## 2020-11-03 RX ORDER — AMOXICILLIN 250 MG
1 CAPSULE ORAL 2 TIMES DAILY PRN
Status: DISCONTINUED | OUTPATIENT
Start: 2020-11-03 | End: 2020-11-04 | Stop reason: HOSPADM

## 2020-11-03 RX ORDER — ERGOCALCIFEROL 1.25 MG/1
50000 CAPSULE ORAL
Qty: 4 CAPSULE | Refills: 2 | Status: SHIPPED | OUTPATIENT
Start: 2020-11-03 | End: 2021-02-01

## 2020-11-03 RX ORDER — ERGOCALCIFEROL 1.25 MG/1
50000 CAPSULE ORAL
Status: DISCONTINUED | OUTPATIENT
Start: 2020-11-03 | End: 2020-11-04 | Stop reason: HOSPADM

## 2020-11-03 RX ADMIN — HYDROCODONE BITARTRATE AND ACETAMINOPHEN 1 TABLET: 7.5; 325 TABLET ORAL at 04:11

## 2020-11-03 RX ADMIN — CHOLECALCIFEROL (VITAMIN D3) 25 MCG (1,000 UNIT) TABLET 1000 UNITS: TABLET at 09:11

## 2020-11-03 RX ADMIN — SODIUM CHLORIDE, SODIUM LACTATE, POTASSIUM CHLORIDE, AND CALCIUM CHLORIDE: .6; .31; .03; .02 INJECTION, SOLUTION INTRAVENOUS at 04:11

## 2020-11-03 RX ADMIN — HYDROCODONE BITARTRATE AND ACETAMINOPHEN 1 TABLET: 7.5; 325 TABLET ORAL at 09:11

## 2020-11-03 RX ADMIN — SODIUM CHLORIDE, SODIUM LACTATE, POTASSIUM CHLORIDE, AND CALCIUM CHLORIDE: .6; .31; .03; .02 INJECTION, SOLUTION INTRAVENOUS at 09:11

## 2020-11-03 RX ADMIN — SODIUM CHLORIDE, SODIUM LACTATE, POTASSIUM CHLORIDE, AND CALCIUM CHLORIDE: 600; 310; 30; 20 INJECTION, SOLUTION INTRAVENOUS at 12:11

## 2020-11-03 RX ADMIN — DOCUSATE SODIUM 50MG AND SENNOSIDES 8.6MG 1 TABLET: 8.6; 5 TABLET, FILM COATED ORAL at 08:11

## 2020-11-03 RX ADMIN — ENOXAPARIN SODIUM 40 MG: 40 INJECTION SUBCUTANEOUS at 04:11

## 2020-11-03 RX ADMIN — LIDOCAINE 1 PATCH: 50 PATCH TOPICAL at 09:11

## 2020-11-03 RX ADMIN — POLYETHYLENE GLYCOL 3350 17 G: 17 POWDER, FOR SOLUTION ORAL at 09:11

## 2020-11-03 NOTE — PLAN OF CARE
Pt alert and oriented x4. VSS. Rested well off and on throughout the night. Medicated x2 with hydrocodone for pain; pain relief obtained. Denies any shortness of breath. Safety maintained. Will continue to monitor.

## 2020-11-03 NOTE — PT/OT/SLP PROGRESS
"Occupational Therapy   Treatment    Name: Lance Bob  MRN: [id number]  Admitting Diagnosis:  Closed compression fracture of L3 lumbar vertebra, initial encounter       Recommendations:     Discharge Recommendations: home health OT  Discharge Equipment Recommendations:  bedside commode, bath bench, walker, rolling  Barriers to discharge:  None    Assessment:     Lance Bob is a 70 y.o. male with a medical diagnosis of Closed compression fracture of L3 lumbar vertebra, initial encounter.  Pt with good participation during session.  He performed bed mobility with Mod A and functional transfer with Min A with RW.  Pt ambulated a functional household distance in his room with CGA-Min A with RW.  He also performed ADL tasks in the bathroom while standing with CGA with RW.  Pt's oxygen saturation level remained in the mid-to-upper 90s after performing activity and his HR remained stable.  At the conclusion of the session, pt's BP read 151/68 while pt was supine with HOB elevated.  RN was notified.  He presents with the following deficits. Performance deficits affecting function are weakness, impaired endurance, impaired self care skills, impaired functional mobilty, gait instability, impaired balance, decreased lower extremity function, pain, orthopedic precautions, decreased upper extremity function.     Rehab Prognosis:  Good; patient would benefit from acute skilled OT services to address these deficits and reach maximum level of function.       Plan:     Patient to be seen 4 x/week to address the above listed problems via self-care/home management, therapeutic exercises, therapeutic activities, neuromuscular re-education  · Plan of Care Expires: 12/01/20  · Plan of Care Reviewed with: patient, spouse    Subjective   "I feel better since yesterday."  Pain/Comfort:  · Pain Rating 1: 4/10(at rest at beginning of session, but increased to 8/10 during activity)  · Location - Side 1: Left  · Location - Orientation " "1: generalized  · Location 1: hip  · Pain Addressed 1: Pre-medicate for activity, Reposition, Distraction, Cessation of Activity  · Pain Rating Post-Intervention 1: 4/10  · Pain Rating 2: 4/10(at rest at beginning of session, but increased to 8/10 during activity)  · Location - Orientation 2: lower  · Location 2: back  · Pain Addressed 2: Pre-medicate for activity, Reposition, Distraction, Cessation of Activity  · Pain Rating Post-Intervention 2: 4/10    Objective:     Communicated with: RN prior to session.  Patient found HOB elevated with telemetry, blood pressure cuff, pulse ox (continuous), peripheral IV with his wife present upon OT entry to room.    General Precautions: Standard, fall   Orthopedic Precautions:spinal precautions   Braces: LSO     Occupational Performance:     Bed Mobility:    · Patient completed Rolling/Turning to Right with minimum assistance  · Patient completed Scooting/Bridging anteriorly to EOB while sitting with stand-by assistance   · Patient completed Supine to Sit with moderate assistance for trunk management  · Patient completed Sit to Supine with moderate assistance for LE management (especially LLE)    Functional Mobility/Transfers:  · Patient completed Sit <> Stand Transfer from EOB with minimum assistance  with  hand-held assist and rolling walker   · Functional Mobility: Pt ambulated a functional household distance in his room from EOB to the bathroom and back with CGA-Min A for walker and line management.  Pt with unsteady gait but no LOB or SOB.    Activities of Daily Living:  · Grooming: contact guard assistance with RW to wash his hands while standing at bathroom sink  · Upper Body Dressing: maximal assistance to ricky/doff LSO brace  · Toileting: contact guard assistance with RW while standing to urinate into the toilet      [facility] 6 Click ADL: 14    Treatment & Education:  -Pt and his wife educated on "BLT" of spinal precautions - no bending, lifting over 5-10 lbs, and no " twisting.  Pt and his wife also educated on importance of donning LSO brace for all EOB/OOB activity.  Pt and his wife verbalized understanding.    -Pt and his wife edu on role of OT, POC, safety when performing self care tasks, benefit of performing OOB activity, and safety when performing functional transfers and mobility.    - Self care tasks completed-- as noted above       Patient left HOB elevated with all lines intact, call button in reach, RN notified and his wife presentEducation:      GOALS:   Multidisciplinary Problems     Occupational Therapy Goals        Problem: Occupational Therapy Goal    Goal Priority Disciplines Outcome Interventions   Occupational Therapy Goal     OT, PT/OT Ongoing, Progressing    Description: Goals to be met by: 11/12/20    Patient will increase functional independence with ADLs by performing:    UE Dressing with Set-up Assistance.  LE Dressing with Set-up Assistance and Assistive Devices as needed.  Grooming while standing at sink with Supervision.  Toileting from toilet with Supervision for hygiene and clothing management.   Supine to sit with Supervision to increase bed mobility independence.  Step transfer with Stand-by Assistance with AD as needed to prepare for household mobility to complete occupations of choice.   Toilet transfer to toilet with Supervision.  Pt and caregiver to don LSO brace I'ly for safe completion of OOB ax's.                     Time Tracking:     OT Date of Treatment: 11/03/20  OT Start Time: 1003  OT Stop Time: 1031  OT Total Time (min): 28 min    Billable Minutes:Self Care/Home Management 14 min.  Therapeutic Activity 14 min.    Eligio Fitzgerald, OT  11/3/2020

## 2020-11-03 NOTE — MEDICAL/APP STUDENT
[facility], [place]  CCU Progress Note      Lance Bob  YOB: 1950  Medical Record Number:  [id number]  Attending Physician:  Austin Call MD   Date of Admission: 11/1/2020       Hospital Day:  1  Current Principal Problem:  Closed compression fracture of L3 lumbar vertebra, initial encounter      History     Cc: Pain secondary to trauma(tree falling)    HPI  70 year old male with no known significant medical history presents after a tree fell on to his back. He is complaining of back pain and hip pain which is 10/10. This began immediately after the tree fell onto him. He denies head trama, loss of consciousness. Denies any infective symptoms. Denies numbness, tingling, loss of bladder or bowel function.   In the ED he was hemodynamically stable and afebrile. Imaging revealed fracture of T6 and L2 fractures. Neurosurgery was consulted and have no neurosurgical intervention planned at this time. He was admitted to hospital medicine for pain management.       71 yo M admitted for l2 and l3 fracture after a tree fell on his back. Neurosurgery consulted, deferred surgical tx, so treated with PT, brace, and pain control. Found to have rhabdo, so treated with aggressive hydration, trending CPK, K, and Ca.     Interval History:   Pt reports that his pain is tolerable with medication. No acute events overnight, pt states that there has been no change in ROM of the left extremity since yesterday. Pt has not passed a BM since the accident on 11/1.      Medications  Scheduled Meds:   enoxaparin  40 mg Subcutaneous Q24H    lidocaine  1 patch Transdermal Daily    polyethylene glycol  17 g Oral Daily     Continuous Infusions:  PRN Meds:.acetaminophen, dextrose 50%, dextrose 50%, glucagon (human recombinant), glucose, glucose, HYDROcodone-acetaminophen, methocarbamoL, morphine, naloxone, ondansetron, sodium chloride 0.9%      PSFH:  Please see admission note and assessment and plan  below.      ROS   Admits Denies   Constitutional  Chills, diaphoresis   Eyes  Visual changes   ENMT  Dysphagia, Epistaxis, nasal congestion, hearing loss   Cardiovascular  Chest pain, palpitations, edema   Respiratory  Cough, dyspnea, wheezing   Gastrointestinal  Nausea, vomiting, constipation, diarrhea, anorexia.     Genitourinary  Dysuria, incontinence   Musculoskeletal Hip pain, L knee pain, decreased range of movement due to pain    Integumentary  Rash, inflammation, burning   Neruo-Psychiatric Decreased sleep due to pain  Anxiety.  Changes in speech, strength, sensation.     Endocrine     Hematologic  Abnormal bruising, bleeding   Immunologic  Inflammation, pain at IV sites.  Pruritis.         Physical Examination     General:  Comfortable in bed.     Vital Signs  Vitals  Temp: 98.3 °F (36.8 °C)  Temp src: Oral  Pulse: 61  Heart Rate Source: Monitor  Resp: 16  SpO2: 100 %  Pulse Oximetry Type: Continuous  O2 Device (Oxygen Therapy): room air  BP: (!) 148/74  MAP (mmHg): 104  BP Location: Right arm  BP Method: Automatic  Patient Position: Lying          24 Hour VS Range    Temp:  [98.2 °F (36.8 °C)-98.9 °F (37.2 °C)]   Pulse:  [61-84]   Resp:  [16-27]   BP: (137-148)/(73-81)   SpO2:  [98 %-100 %]     Intake/Output Summary (Last 24 hours) at 11/3/2020 0824  Last data filed at 11/3/2020 0600  Gross per 24 hour   Intake 2480 ml   Output 550 ml   Net 1930 ml       Physical Exam   Constitutional: He is oriented to person, place, and time and well-developed, well-nourished, and in no distress.   HENT:   Head: Normocephalic and atraumatic.   Eyes: Pupils are equal, round, and reactive to light. Conjunctivae and EOM are normal.   Neck: No JVD present.   Cardiovascular: Normal rate, regular rhythm and normal heart sounds.   Pulmonary/Chest: Effort normal. No stridor. No respiratory distress.   Abdominal: Bowel sounds are normal.   Musculoskeletal:         General: Tenderness present.      Right shoulder: He exhibits  decreased range of motion, swelling and pain. He exhibits no effusion.   Neurological: He is alert and oriented to person, place, and time.   Skin: Skin is warm and dry.   Psychiatric: Mood, memory and affect normal.                   Data       Recent Labs   Lab 11/01/20 1850 11/01/20 1900 11/02/20  0605   WBC 10.57  --  5.95   HGB 13.6*  --  13.7*   HCT 41.2 40 41.6     --  147*        Recent Labs   Lab 11/01/20 1850   INR 1.0        Recent Labs   Lab 11/01/20 1850 11/02/20  0605 11/02/20  2135    141  --    K 4.0 3.6 3.9    109  --    CO2 21* 20*  --    BUN 18 15  --    CREATININE 1.0 0.8  --    ANIONGAP 13 12  --    CALCIUM 9.0 8.4*  --         Recent Labs   Lab 11/01/20 1850 11/02/20  0605   PROT 7.1 6.5   ALBUMIN 4.2 3.8   BILITOT 0.7 1.3*   ALKPHOS 76 73   AST 68* 391*   ALT 36 114*        Imaging Results           MRI Lumbar Spine W WO Cont (Final result)  Result time 11/02/20 01:54:30    Final result by Nuria Esparza MD (11/02/20 01:54:30)                 Impression:      Superior endplate mild compression fracture of the L3 vertebra with mild wedging to the right.  No evidence of fracture retropulsion, canal hematoma or vertebral body listhesis is seen as result.  There is no visible extension a fracture into the posterior elements.    The left transverse process fracture at L2 is not very well demonstrated with MRI without fat saturation in the axial plane.  There is no significant marrow enhancing lesions to support a pathologic fracture related to a mass/neoplasm with certainty.  The fracture could explained by trauma particularly with an adjacent vertebral body compression.  Follow-up is suggested and additional sequences with fat saturation suggested at the time of follow-up.    No suspicious marrow lesions or enhancing lesions in the vertebral bodies are noted as imaged.    Multilevel spondylosis without canal or significant neural foraminal stenosis.  Detailed findings  are described above.    Paraspinous pelvic floor muscular edematous changes on the left are likely post-traumatic on the left and are partially visualized.    Multiple renal cystic foci with several incompletely imaged.  These findings are better characterized on the abdomen pelvis CT 11/01/2020.    This report was flagged in Epic as abnormal.      Electronically signed by: Nuria Esparza  Date:    11/02/2020  Time:    01:54             Narrative:    EXAMINATION:  MRI LUMBAR SPINE W WO CONTRAST    CLINICAL HISTORY:  L/S-spine fracture, pathological; patient post trauma and fall with back injury and L2 left transverse process fracture.    TECHNIQUE:  Multiplanar, multisequence MR images were acquired from the thoracolumbar junction to the sacrum without and with IV injection of 6 cc of Gadavist contrast.    COMPARISON:  CTA of the lumbar spine 11/01/2020    FINDINGS:  Alignment: There is no subluxation.    Vertebrae: Note is made of a superior endplate compression fracture of the L3 vertebral body more pronounced on the right side extending towards the base of the right pedicle.  There is right-sided mild wedge deformity and cortical irregularity with relative levoscoliosis noted on the prior CT as result.  There is no retropulsion into the spinal canal or significant listhesis.    There is no evidence of fracture extension into the right pedicle or posterior elements of the vertebra L3.    The L2 transverse process fracture as noted by CT is seen to a limited degree due to the lack of fat saturation on the axial series but it is present with expected enhancement of the surrounding soft tissues related to edema/posttraumatic changes.  The presence of a definitive soft tissue enhancing mass to suggest pathologic fracture is not confirmed as imaged.  There is also limited visualization of the transverse process fracture on the left on sagittal imaging with fat saturation limiting characterization.    There are no  other marrow signal abnormalities throughout the lumbar vertebrae.    Cord: Normal signal in the visualized distal spinal cord is noted.  The conus terminates at the T12-L1 disc level    Degenerative findings:    T12-L1: No dorsal focal disc abnormality or stenosis.    L1-L2: There is moderate spondylosis with broad-based small dorsal disc protrusion more pronounced in the left foraminal region.  There is no canal stenosis or significant neural foraminal stenosis.    L2-L3: B mild-to-moderate spondylosis with broad-based small dorsal annular bulge/protrusion is noted.  There is no canal or neural foraminal stenosis.  Facet hypertrophic changes are present.    L3-L4: There is minor dorsal annular bulging without significant disc protrusion or canal or neural foraminal stenosis.  Mild facet hypertrophy bilaterally is noted.    L4-L5: There is broad-based small disc protrusion in the canal which extends to the neural foramina bilaterally.  There is no canal stenosis.  Facet and ligamentous mild bilateral hypertrophy is noted contributing to mild bilateral inferior neural foraminal stenosis.    L5-S1: There is moderate spondylosis and a small broad-based dorsal disc protrusion.  There is no canal stenosis or significant neural foraminal stenosis.  Mild to moderate bilateral facet hypertrophic changes are present.    Sacroiliac joint degenerative changes are seen.    Paraspinous soft tissues and musculature are remarkable for muscular edematous changes in the left pelvic floor musculature which is only seen on a few slices of the sagittal series 7 images 11 through 16.    Multiple cystic foci in the kidneys with dominant lesion on the right noted with several incompletely visualized.                                CT Cervical Spine Without Contrast (Final result)  Result time 11/01/20 22:09:59    Final result by Sudhakra Weiner MD (11/01/20 22:09:59)                 Impression:      Acute nondisplaced fracture of the T6  spinous process.    Lytic lesion centered in the left L2 transverse process with suspected pathological fracture of the left transverse process at L2.  MRI of the lumbar spine without and with contrast is suggested for further evaluation.    Multilevel cervical spondylolysis with likely moderate canal stenosis at C3-4 and C5-6.    Mild multilevel lumbar spondylosis with no significant spinal canal stenosis.    This report was flagged in Epic as abnormal.    Electronically signed by resident: Mukesh Mtz  Date:    11/01/2020  Time:    21:29    Electronically signed by: Sudhakar Weiner MD  Date:    11/01/2020  Time:    22:09             Narrative:    EXAMINATION:  CT CERVICAL SPINE WITHOUT CONTRAST; CT LUMBAR SPINE WITHOUT CONTRAST; CT THORACIC SPINE WITHOUT CONTRAST    CLINICAL HISTORY:  Neck trauma (Age => 64y);; Back pain or radiculopathy, trauma;; Mid-back trauma;    TECHNIQUE:  Multiplanar CT images of the cervical, thoracic, and lumbar spine were performed without the administration of contrast.    COMPARISON:  CT chest abdomen pelvis 11/01/2020 and CT scan abdomen pelvis dated 05/21/2015.    FINDINGS:  CERVICAL    Alignment: Normal.    Vertebrae: Vertebral body heights are well maintained.  No evidence of fractures.    Skull base and craniocervical junction: Unremarkable    Multilevel cervical spondylolysis with subchondral cystic change, endplate erosion, uncovertebral spurring, facet arthropathy, and posterior osteophyte disc complexes.  Limited evaluation of intraspinal compartment reveals likely moderate canal stenosis at C3-4 and C5-6.  Variable degrees of foraminal narrowing    THORACIC    Normal kyphotic alignment of the thoracic spine.  Mild S-shaped scoliosis of the thoracolumbar spine.    Mild inferior endplate irregularity at T5 through T10, likely Schmorl's nodes.  Vertebral body heights are otherwise well maintained.    Acute nondisplaced fracture of the T6 spinous process (series 602, image  61).    No significant spinal canal stenosis or neural foraminal narrowing.    LUMBAR    Alignment: Normal.    Vertebral body heights are well maintained.  There is desiccation of the discs at L4-5 and L5-S1 consistent with degenerative change.  There is a lytic lesion centered in the left transverse process at L2.  There is suspected pathological fracture of the left L2 transverse process.    Degenerative findings:    Mild multilevel lumbar spondylosis with mild facet arthropathy, anterior osteophytes, and posterior disc bulges.  No significant spinal canal stenosis.  Variable neural foraminal narrowing    Paraspinal muscles & soft tissues: See same day chest abdomen pelvis report for detailed organ evaluation of the thorax and abdominopelvic compartments.                                CT Thoracic Spine Without Contrast (Final result)  Result time 11/01/20 22:09:59    Final result by Sudhakar Weiner MD (11/01/20 22:09:59)                 Impression:      Acute nondisplaced fracture of the T6 spinous process.    Lytic lesion centered in the left L2 transverse process with suspected pathological fracture of the left transverse process at L2.  MRI of the lumbar spine without and with contrast is suggested for further evaluation.    Multilevel cervical spondylolysis with likely moderate canal stenosis at C3-4 and C5-6.    Mild multilevel lumbar spondylosis with no significant spinal canal stenosis.    This report was flagged in Epic as abnormal.    Electronically signed by resident: Mukesh Mtz  Date:    11/01/2020  Time:    21:29    Electronically signed by: Sudhakar Weiner MD  Date:    11/01/2020  Time:    22:09             Narrative:    EXAMINATION:  CT CERVICAL SPINE WITHOUT CONTRAST; CT LUMBAR SPINE WITHOUT CONTRAST; CT THORACIC SPINE WITHOUT CONTRAST    CLINICAL HISTORY:  Neck trauma (Age => 64y);; Back pain or radiculopathy, trauma;; Mid-back trauma;    TECHNIQUE:  Multiplanar CT images of the cervical,  thoracic, and lumbar spine were performed without the administration of contrast.    COMPARISON:  CT chest abdomen pelvis 11/01/2020 and CT scan abdomen pelvis dated 05/21/2015.    FINDINGS:  CERVICAL    Alignment: Normal.    Vertebrae: Vertebral body heights are well maintained.  No evidence of fractures.    Skull base and craniocervical junction: Unremarkable    Multilevel cervical spondylolysis with subchondral cystic change, endplate erosion, uncovertebral spurring, facet arthropathy, and posterior osteophyte disc complexes.  Limited evaluation of intraspinal compartment reveals likely moderate canal stenosis at C3-4 and C5-6.  Variable degrees of foraminal narrowing    THORACIC    Normal kyphotic alignment of the thoracic spine.  Mild S-shaped scoliosis of the thoracolumbar spine.    Mild inferior endplate irregularity at T5 through T10, likely Schmorl's nodes.  Vertebral body heights are otherwise well maintained.    Acute nondisplaced fracture of the T6 spinous process (series 602, image 61).    No significant spinal canal stenosis or neural foraminal narrowing.    LUMBAR    Alignment: Normal.    Vertebral body heights are well maintained.  There is desiccation of the discs at L4-5 and L5-S1 consistent with degenerative change.  There is a lytic lesion centered in the left transverse process at L2.  There is suspected pathological fracture of the left L2 transverse process.    Degenerative findings:    Mild multilevel lumbar spondylosis with mild facet arthropathy, anterior osteophytes, and posterior disc bulges.  No significant spinal canal stenosis.  Variable neural foraminal narrowing    Paraspinal muscles & soft tissues: See same day chest abdomen pelvis report for detailed organ evaluation of the thorax and abdominopelvic compartments.                                CT Lumbar Spine Without Contrast (Final result)  Result time 11/01/20 22:09:59    Final result by Sudhakar Weiner MD (11/01/20 22:09:59)                  Impression:      Acute nondisplaced fracture of the T6 spinous process.    Lytic lesion centered in the left L2 transverse process with suspected pathological fracture of the left transverse process at L2.  MRI of the lumbar spine without and with contrast is suggested for further evaluation.    Multilevel cervical spondylolysis with likely moderate canal stenosis at C3-4 and C5-6.    Mild multilevel lumbar spondylosis with no significant spinal canal stenosis.    This report was flagged in Epic as abnormal.    Electronically signed by resident: Mukesh Mtz  Date:    11/01/2020  Time:    21:29    Electronically signed by: Sudhakar Weiner MD  Date:    11/01/2020  Time:    22:09             Narrative:    EXAMINATION:  CT CERVICAL SPINE WITHOUT CONTRAST; CT LUMBAR SPINE WITHOUT CONTRAST; CT THORACIC SPINE WITHOUT CONTRAST    CLINICAL HISTORY:  Neck trauma (Age => 64y);; Back pain or radiculopathy, trauma;; Mid-back trauma;    TECHNIQUE:  Multiplanar CT images of the cervical, thoracic, and lumbar spine were performed without the administration of contrast.    COMPARISON:  CT chest abdomen pelvis 11/01/2020 and CT scan abdomen pelvis dated 05/21/2015.    FINDINGS:  CERVICAL    Alignment: Normal.    Vertebrae: Vertebral body heights are well maintained.  No evidence of fractures.    Skull base and craniocervical junction: Unremarkable    Multilevel cervical spondylolysis with subchondral cystic change, endplate erosion, uncovertebral spurring, facet arthropathy, and posterior osteophyte disc complexes.  Limited evaluation of intraspinal compartment reveals likely moderate canal stenosis at C3-4 and C5-6.  Variable degrees of foraminal narrowing    THORACIC    Normal kyphotic alignment of the thoracic spine.  Mild S-shaped scoliosis of the thoracolumbar spine.    Mild inferior endplate irregularity at T5 through T10, likely Schmorl's nodes.  Vertebral body heights are otherwise well maintained.    Acute  nondisplaced fracture of the T6 spinous process (series 602, image 61).    No significant spinal canal stenosis or neural foraminal narrowing.    LUMBAR    Alignment: Normal.    Vertebral body heights are well maintained.  There is desiccation of the discs at L4-5 and L5-S1 consistent with degenerative change.  There is a lytic lesion centered in the left transverse process at L2.  There is suspected pathological fracture of the left L2 transverse process.    Degenerative findings:    Mild multilevel lumbar spondylosis with mild facet arthropathy, anterior osteophytes, and posterior disc bulges.  No significant spinal canal stenosis.  Variable neural foraminal narrowing    Paraspinal muscles & soft tissues: See same day chest abdomen pelvis report for detailed organ evaluation of the thorax and abdominopelvic compartments.                               CT Chest Abdomen Pelvis With Contrast (Final result)  Result time 11/01/20 22:16:11    Final result by Abel Salas MD (11/01/20 22:16:11)                 Impression:      No acute intrathoracic or abdominopelvic pathology.    Colonic diverticulosis without evidence of diverticulitis.    Other stable findings as above.    Electronically signed by resident: Mukesh Mtz  Date:    11/01/2020  Time:    21:18    Electronically signed by: Abel Salas  Date:    11/01/2020  Time:    22:16             Narrative:    EXAMINATION:  CT CHEST ABDOMEN PELVIS WITH CONTRAST (XPD)    CLINICAL HISTORY:  Chest-abdomen-pelvis trauma, blunt;    TECHNIQUE:  The patient was surveyed from the lung apices through the pelvis after the administration of 75 cc Omni 350 IV contrast..  Data was reconstructed for coronal, sagittal, and axial images.    COMPARISON:  CT abdomen pelvis 05/21/2015    CT chest 11/06/2018    FINDINGS:  CHEST:    Neck and thoracic soft tissues: unremarkable.    Aorta:A left sided aortic arch with 3 branch vessels.  The thoracic aorta maintains normal  caliber, contour, and course.  No significant atherosclerotic calcification.    Heart: The heart is not enlarged. No pericardial effusion .    Airways: The trachea is midline and proximal airways are patent.    Lungs/Pleura: Lungs are symmetrically expanded.  No evidence of consolidation, mass, significant pleural thickening, or pleural fluid.    Hilum/Mediastinum: no axillary or mediastinal lymph node enlargement.    Esophagus: The esophagus maintains a normal course and caliber.    ABDOMEN/PELVIS:    Liver: The liver is normal in size and attenuation.  No focal hepatic abnormality is seen.  Portal veins are patent.    Gallbladder/Biliary System: the gallbladder is unremarkable.  No intrahepatic or extrahepatic biliary ductal dilatation is noted.    Stomach: Unremarkable    Spleen: Unremarkable    Pancreas: Unremarkable    Adrenal glands: Unremarkable    Renal and Urinary system: Right kidney appears slightly atrophic when compared to left.  Stable appearance of bilateral simple renal cysts, largest of which measures 3.9 cm on the right kidney.  No evidence of renal stones.Kidneys concentrate contrast appropriately.  No hydronephrosis.  The ureters appear normal in course and caliber without evidence of ureteral dilatation. Urinary bladder wall is thickened likely due to chronic outlet obstruction.    Reproductive: Prostatomegaly    Bowel: The visualized loops of small and large bowel show no evidence of obstruction or inflammation.  Appendix is visualized without evidence of obstruction or surrounding inflammatory change.  There scattered colonic diverticuli.    Peritoneum: No ascites, free fluid, or intraperitoneal free air.    Lymph Nodes: No pathologic chase enlargement in the abdomen or pelvis.    Aorta: the abdominal aorta is normal in course and caliber.  Mild calcific and non-calcific atherosclerotic plaque.    Bones: Degenerative changes of the spine.  No evidence of acute fracture or osseus destructive  process.    Soft tissues: unremarkable.                               X-Ray Femur AP/LAT Left (Final result)  Result time 11/01/20 19:13:15    Final result by Joaquín Rebolledo MD (11/01/20 19:13:15)                 Impression:      1. No acute displaced fracture or dislocation of the pelvis or left femur.      Electronically signed by: Joaquín Rebolledo MD  Date:    11/01/2020  Time:    19:13             Narrative:    EXAMINATION:  XR PELVIS ROUTINE AP; XR FEMUR 2 VIEW LEFT    CLINICAL HISTORY:  fall;  Unspecified fall, initial encounter    TECHNIQUE:  AP view of the pelvis was performed.  Four views left femur.    COMPARISON:  CT 05/21/2015    FINDINGS:  Single-view pelvis, four views left femur.    The bilateral sacroiliac joints are intact.  The pubic symphysis is intact.  The bilateral femoroacetabular joints are intact.  There is osteopenia.  There is remote irregularly healed injury of the mid aspect of the left femoral diaphysis.  Prominent degenerative changes are noted of the knee.  No large knee joint effusion.                               X-Ray Pelvis Routine AP (Final result)  Result time 11/01/20 19:13:15   Procedure changed from X-Ray Hip 2 View Left     Final result by Joaquín Rebolledo MD (11/01/20 19:13:15)                 Impression:      1. No acute displaced fracture or dislocation of the pelvis or left femur.      Electronically signed by: Joaquín Rebolledo MD  Date:    11/01/2020  Time:    19:13             Narrative:    EXAMINATION:  XR PELVIS ROUTINE AP; XR FEMUR 2 VIEW LEFT    CLINICAL HISTORY:  fall;  Unspecified fall, initial encounter    TECHNIQUE:  AP view of the pelvis was performed.  Four views left femur.    COMPARISON:  CT 05/21/2015    FINDINGS:  Single-view pelvis, four views left femur.    The bilateral sacroiliac joints are intact.  The pubic symphysis is intact.  The bilateral femoroacetabular joints are intact.  There is osteopenia.  There is remote irregularly healed injury of  the mid aspect of the left femoral diaphysis.  Prominent degenerative changes are noted of the knee.  No large knee joint effusion.                                      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 Unable to walk due to pain.  CT findings of T6 and L2 fractures, with possible lytic lesion at L2  MRI to evaluate lytic lesion - The presence of a definitive soft tissue enhancing mass to suggest pathologic fracture is not confirmed as imaged     Plan  - pain management Norco 7.5 Q6 PRN, morphine 4mg IV Q6 for breakthrough, methocarbamol 750 q8 PRN, tylenol PRN, lidocaine patch  - narcan reversal ordered  - PT/OT evaluation  - neurosurgery consulted in ED, no intervention at this time.  - q4 neuro checks  - PT/OT  - fall precautions  - Bowel regimen   - encourage incentive spirometry       Rhabdomyolysis   Increased CK to >16,000 on arrival. UA showed +blood no RBCs indicating rhabdomyolysis. Fluids 200mL/kg/hr given yesterday.      Plan  - Continue fluids 125mL/kg  - Strict I's and O's     Smoking   Patient has a long standing history of smoking tobacco. Current smoker, 5 cigarettes a day. Pt states he wants to stop smoking, wants to use this hospitalization as an opportunity to stop.  Does not want a patch.   Plan:   - Encourage smoking cessation   - Incentive spirometry while in patient          Disposition        Plan       - PT/OT recs include going home with home health        - Pt needs note for work, pt's work involves heavy lifting       VTE Risk Mitigation (From admission, onward)         Ordered     enoxaparin injection 40 mg  Every 24 hours      11/02/20 0526     IP VTE HIGH RISK PATIENT  Once      11/02/20 0525     Place sequential compression device  Until discontinued      11/02/20 0525      Place sequential compression device  Until discontinued      11/02/20 0444                [facility]ozoB[facility]  Medical Student  [facility]

## 2020-11-03 NOTE — ASSESSMENT & PLAN NOTE
71 y/o M  who had a tree fall on him ( was cutting trees)  found to have T6 spinous process fx and left L2 TP fx with no neurological deficits. Unable to walk due to pain.  CT findings of T6 and L2 fractures, with possible lytic lesion at L2  MRI to evaluate lytic lesion - The presence of a definitive soft tissue enhancing mass to suggest pathologic fracture is not confirmed as imaged    Plan  - pain management Norco 7.5 Q6 PRN, morphine 4mg IV Q6 for breakthrough, methocarbamol 750 q8 PRN, tylenol PRN, lidocaine patch  - narcan reversal ordered  - PT/OT evaluation, recommend home with HH  - neurosurgery consulted in ED, no intervention at this time.  - q4 neuro checks  - fall precautions

## 2020-11-03 NOTE — PROGRESS NOTES
[facility] Medicine  Progress Note    Patient Name: Lance Bob  MRN: [id number]  Patient Class: IP- Inpatient   Admission Date: 11/1/2020  Length of Stay: 1 days  Attending Physician: Austin Call MD  Primary Care Provider: Primary Doctor No        Subjective:     Principal Problem:Traumatic rhabdomyolysis        HPI:  70 year old male with no known significant medical history presents after a tree fell on to his back. He is complaining of back pain and hip pain which is 10/10. This began immediately after the tree fell onto him. He denies head trama, loss of consciousness. Denies any infective symptoms. Denies numbness, tingling, loss of bladder or bowel function.   In the ED he was hemodynamically stable and afebrile. Imaging revealed fracture of T6 and L2 fractures. Neurosurgery was consulted and have no neurosurgical intervention planned at this time. He was admitted to hospital medicine for pain management.      Overview/Hospital Course:  71 yo M admitted for l2 and l3 fracture after a tree fell on his back. Neurosurgery consulted, deferred surgical tx, so treated with PT, brace, and pain control. Found to have rhabdo, so treated with aggressive hydration, trending CPK.     Interval History: NAEON. Patient slept well and states his pain is well controlled with the medicines PRN. 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 He understands his management plan and knows he will be here another day.     Review of Systems   Constitutional: Positive for activity change and fatigue. Negative for appetite change.   Respiratory: Negative for cough and shortness of breath.    Cardiovascular: Negative for chest pain and leg swelling.   Gastrointestinal: Negative for abdominal pain, nausea and vomiting.   Genitourinary: Negative for difficulty urinating and dysuria.   Musculoskeletal: Positive for back pain and gait  problem. Negative for arthralgias.   Neurological: Negative for weakness, light-headedness and numbness.   Psychiatric/Behavioral: Negative for agitation and confusion.     Objective:     Vital Signs (Most Recent):  Temp: 97.7 °F (36.5 °C) (11/03/20 0800)  Pulse: 66 (11/03/20 0800)  Resp: 16 (11/03/20 0933)  BP: (!) 144/105 (11/03/20 0800)  SpO2: 100 % (11/03/20 0400) Vital Signs (24h Range):  Temp:  [97.7 °F (36.5 °C)-98.7 °F (37.1 °C)] 97.7 °F (36.5 °C)  Pulse:  [61-83] 66  Resp:  [16-27] 16  SpO2:  [98 %-100 %] 100 %  BP: (144-148)/() 144/105     Weight: 45 kg (99 lb 3.3 oz)  Body mass index is 18.15 kg/m².    Intake/Output Summary (Last 24 hours) at 11/3/2020 1405  Last data filed at 11/3/2020 0600  Gross per 24 hour   Intake 2480 ml   Output 550 ml   Net 1930 ml      Physical Exam  Vitals signs and nursing note reviewed.   Constitutional:       Appearance: Normal appearance.   HENT:      Mouth/Throat:      Mouth: Mucous membranes are moist.   Eyes:      Pupils: Pupils are equal, round, and reactive to light.   Cardiovascular:      Rate and Rhythm: Normal rate and regular rhythm.      Heart sounds: No murmur.   Pulmonary:      Effort: Pulmonary effort is normal. No respiratory distress.      Breath sounds: Normal breath sounds. No rales.   Abdominal:      General: Abdomen is flat. There is no distension.      Palpations: Abdomen is soft.      Tenderness: There is no abdominal tenderness.   Musculoskeletal:         General: Deformity and signs of injury (spinal fracture) present.      Comments: Patient cannot move out of bed without pain. States when working with PT/OT he will walk down the [place] with a walker   Skin:     General: Skin is warm and dry.   Neurological:      General: No focal deficit present.      Mental Status: He is alert and oriented to person, place, and time. Mental status is at baseline.      Cranial Nerves: No cranial nerve deficit.   Psychiatric:         Mood and Affect: Mood normal.          Behavior: Behavior normal.         Thought Content: Thought content normal.         Significant Labs:   CBC:   Recent Labs   Lab 11/01/20  1850 11/01/20  1900 11/02/20  0605 11/03/20  0519   WBC 10.57  --  5.95 5.69   HGB 13.6*  --  13.7* 12.8*   HCT 41.2 40 41.6 38.9*     --  147* 131*     CMP:   Recent Labs   Lab 11/01/20  1850 11/02/20  0605 11/02/20  2135 11/03/20  0519 11/03/20  0756    141  --  137  --    K 4.0 3.6 3.9 4.7 4.0    109  --  101  --    CO2 21* 20*  --  28  --    * 100  --  82  --    BUN 18 15  --  11  --    CREATININE 1.0 0.8  --  0.8  --    CALCIUM 9.0 8.4*  --  8.7  --    PROT 7.1 6.5  --  6.0  --    ALBUMIN 4.2 3.8  --  3.4*  --    BILITOT 0.7 1.3*  --  1.7*  --    ALKPHOS 76 73  --  61  --    AST 68* 391*  --  283*  --    ALT 36 114*  --  105*  --    ANIONGAP 13 12  --  8  --    EGFRNONAA >60.0 >60.0  --  >60.0  --      Results for ARBEN SOTO (MRN [id number]) as of 11/3/2020 14:08   Ref. Range 11/3/2020 05:19   CPK Latest Ref Range: 20 - 200 U/L 9257 (H)   Vit D, 25-Hydroxy Latest Ref Range: 30 - 96 ng/mL 19 (L)     Significant Imaging: I have reviewed and interpreted all pertinent imaging results/findings within the past 24 hours.         Assessment/Plan:      * Traumatic rhabdomyolysis  Patient presented with CPK >85967.  - Patient received LR at 200cc/hr for 18 hrs, CPK improved to >9000 on 11/3    Plan  - LR at 125cc/hr for 18 hours   - improving      Discharge planning issues  Rhabdo is improving but still >9000 on 11/3    Plan  - continue hydration  - Patient will need home health PT/OT  - patient needs follow up with NSGY and work note stating he cannot work until that appointment      Vitamin D deficiency  Patient's vit D is 19    Plan  - ergocalciferol 93953 q weekly      Closed fracture of spinous process of thoracic vertebra with routine healing  69 y/o M  who had a tree fall on him ( was cutting trees)  found to have T6 spinous process fx and  left L2 TP fx with no neurological deficits. Unable to walk due to pain.  CT findings of T6 and L2 fractures, with possible lytic lesion at L2  MRI to evaluate lytic lesion - The presence of a definitive soft tissue enhancing mass to suggest pathologic fracture is not confirmed as imaged    Plan  - pain management Norco 7.5 Q6 PRN, morphine 4mg IV Q6 for breakthrough, methocarbamol 750 q8 PRN, tylenol PRN, lidocaine patch  - narcan reversal ordered  - PT/OT evaluation, recommend home with HH  - neurosurgery consulted in ED, no intervention at this time.  - q4 neuro checks  - fall precautions          VTE Risk Mitigation (From admission, onward)         Ordered     enoxaparin injection 40 mg  Every 24 hours      11/02/20 0526     IP VTE HIGH RISK PATIENT  Once      11/02/20 0525     Place sequential compression device  Until discontinued      11/02/20 0525     Place sequential compression device  Until discontinued      11/02/20 0444                Discharge Planning   DANIELLE: 11/4/2020     Code Status: Full Code   Is the patient medically ready for discharge?:     Reason for patient still in hospital (select all that apply): Patient trending condition and Treatment                     Raegan Rahman MD  Department of Hospital Medicine   [facility]

## 2020-11-03 NOTE — PLAN OF CARE
11/03/20 1452   Post-Acute Status   Post-Acute Authorization Home Health   Home Health Status Awaiting Internal Medical Clearance   Discharge Plan   Discharge Plan A Home with family   Discharge Plan B Home with family;Home Health     CM to follow for discharge planning needs.  TANNER Bell RN  Case Management  [phone]

## 2020-11-03 NOTE — ASSESSMENT & PLAN NOTE
Patient presented with CPK >37864.  - Patient received LR at 200cc/hr for 18 hrs, CPK improved to >9000 on 11/3    Plan  - LR at 125cc/hr for 18 hours   - improving

## 2020-11-03 NOTE — PLAN OF CARE
11/1/2020  6:19 PM   Back pain [M54.9]  Fall [W19.XXXA]  Left hip pain [M25.552]  Acute low back pain due to trauma [M54.5, G89.11]  Lumbar spine pain [M54.5]     CM to bedside to complete discharge planning assessment - obtained from patient.  Pt's wife present at bedside.  His wife will provide transportation home.  Pt lives with his spouse in a single story home with a walk way and 1 step.  No equipment used at home.  No home health services.  Not a dialysis pt.  Not on Coumadin.      Patient provided a Discharge Planning booklet. Patient verbalized understanding.    Contact phone numbers provided.    When medically stable, anticipate discharge plan A - home with family or plan B - Home health.    CM to follow for discharge planning needs.  TANNER Bell RN  Case Management  [phone]         Payor: MEDICARE / Plan: MEDICARE PART A & B / Product Type: [employer] /        [facility] [address] [address] [place] [address]  Phone: [phone] Fax: [phone]       Extended Emergency Contact Information  Primary Emergency Contact: Hannah Bob   [facility]  Home Phone: [phone]  Relation: Spouse     CM to follow for discharge planning needs.    Susie Potter RN  Case Management  [phone]        11/03/20 1417   Discharge Assessment   Assessment Type Discharge Planning Assessment   Confirmed/corrected address and phone number on facesheet? Yes   Assessment information obtained from? Patient   Expected Length of Stay (days) 1   Communicated expected length of stay with patient/caregiver yes   Prior to hospitilization cognitive status: Alert/Oriented   Prior to hospitalization functional status: Independent   Current cognitive status: Alert/Oriented   Current Functional Status: Needs Assistance;Assistive Equipment   Facility Arrived From: NA   Lives With spouse   Able to Return to Prior Arrangements yes   Is patient able to care for  self after discharge? Yes   Who are your caregiver(s) and their phone number(s)? Wife Hannah Bob [phone]   Patient's perception of discharge disposition home or selfcare   Readmission Within the Last 30 Days no previous admission in last 30 days   Patient currently being followed by outpatient case management? No   Patient currently receives any other outside agency services? No   Equipment Currently Used at Home none   Do you have any problems affording any of your prescribed medications? TBD   Is the patient taking medications as prescribed? yes   Does the patient have transportation home? Yes   Transportation Anticipated family or friend will provide  (Wife will provide)   Dialysis Name and Scheduled days NA   Does the patient receive services at the Coumadin Clinic? No   Discharge Plan A Home with family;Home Health   Discharge Plan B Home with family   DME Needed Upon Discharge  other (see comments)  (TBD)   Patient/Family in Agreement with Plan yes

## 2020-11-03 NOTE — PLAN OF CARE
11/03/20 1000   Post-Acute Status   Post-Acute Authorization Home Health   Home Health Status Referrals Sent     SW faxed referral to [facility] via  for review. SW will follow up as needed.    4:05 PM  SW sent additional referral to [facility].    4:16 PM  [facility] -Accepted.    Kelley Haile LMSW  Case Management   [facility]   Ext. [phone]

## 2020-11-03 NOTE — SUBJECTIVE & OBJECTIVE
Interval History: NAEON. Patient slept well and states his pain is well controlled with the medicines PRN. 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 He understands his management plan and knows he will be here another day.     Review of Systems   Constitutional: Positive for activity change and fatigue. Negative for appetite change.   Respiratory: Negative for cough and shortness of breath.    Cardiovascular: Negative for chest pain and leg swelling.   Gastrointestinal: Negative for abdominal pain, nausea and vomiting.   Genitourinary: Negative for difficulty urinating and dysuria.   Musculoskeletal: Positive for back pain and gait problem. Negative for arthralgias.   Neurological: Negative for weakness, light-headedness and numbness.   Psychiatric/Behavioral: Negative for agitation and confusion.     Objective:     Vital Signs (Most Recent):  Temp: 97.7 °F (36.5 °C) (11/03/20 0800)  Pulse: 66 (11/03/20 0800)  Resp: 16 (11/03/20 0933)  BP: (!) 144/105 (11/03/20 0800)  SpO2: 100 % (11/03/20 0400) Vital Signs (24h Range):  Temp:  [97.7 °F (36.5 °C)-98.7 °F (37.1 °C)] 97.7 °F (36.5 °C)  Pulse:  [61-83] 66  Resp:  [16-27] 16  SpO2:  [98 %-100 %] 100 %  BP: (144-148)/() 144/105     Weight: 45 kg (99 lb 3.3 oz)  Body mass index is 18.15 kg/m².    Intake/Output Summary (Last 24 hours) at 11/3/2020 1405  Last data filed at 11/3/2020 0600  Gross per 24 hour   Intake 2480 ml   Output 550 ml   Net 1930 ml      Physical Exam  Vitals signs and nursing note reviewed.   Constitutional:       Appearance: Normal appearance.   HENT:      Mouth/Throat:      Mouth: Mucous membranes are moist.   Eyes:      Pupils: Pupils are equal, round, and reactive to light.   Cardiovascular:      Rate and Rhythm: Normal rate and regular rhythm.      Heart sounds: No murmur.   Pulmonary:      Effort: Pulmonary effort is normal. No respiratory distress.      Breath sounds: Normal  breath sounds. No rales.   Abdominal:      General: Abdomen is flat. There is no distension.      Palpations: Abdomen is soft.      Tenderness: There is no abdominal tenderness.   Musculoskeletal:         General: Deformity and signs of injury (spinal fracture) present.      Comments: Patient cannot move out of bed without pain. States when working with PT/OT he will walk down the [place] with a walker   Skin:     General: Skin is warm and dry.   Neurological:      General: No focal deficit present.      Mental Status: He is alert and oriented to person, place, and time. Mental status is at baseline.      Cranial Nerves: No cranial nerve deficit.   Psychiatric:         Mood and Affect: Mood normal.         Behavior: Behavior normal.         Thought Content: Thought content normal.         Significant Labs:   CBC:   Recent Labs   Lab 11/01/20 1850 11/01/20  1900 11/02/20  0605 11/03/20  0519   WBC 10.57  --  5.95 5.69   HGB 13.6*  --  13.7* 12.8*   HCT 41.2 40 41.6 38.9*     --  147* 131*     CMP:   Recent Labs   Lab 11/01/20  1850 11/02/20  0605 11/02/20  2135 11/03/20  0519 11/03/20  0756    141  --  137  --    K 4.0 3.6 3.9 4.7 4.0    109  --  101  --    CO2 21* 20*  --  28  --    * 100  --  82  --    BUN 18 15  --  11  --    CREATININE 1.0 0.8  --  0.8  --    CALCIUM 9.0 8.4*  --  8.7  --    PROT 7.1 6.5  --  6.0  --    ALBUMIN 4.2 3.8  --  3.4*  --    BILITOT 0.7 1.3*  --  1.7*  --    ALKPHOS 76 73  --  61  --    AST 68* 391*  --  283*  --    ALT 36 114*  --  105*  --    ANIONGAP 13 12  --  8  --    EGFRNONAA >60.0 >60.0  --  >60.0  --      Results for ARBEN SOTO (MRN [id number]) as of 11/3/2020 14:08   Ref. Range 11/3/2020 05:19   CPK Latest Ref Range: 20 - 200 U/L 9257 (H)   Vit D, 25-Hydroxy Latest Ref Range: 30 - 96 ng/mL 19 (L)     Significant Imaging: I have reviewed and interpreted all pertinent imaging results/findings within the past 24 hours.

## 2020-11-03 NOTE — PLAN OF CARE
[facility]    HOME HEALTH ORDERS  FACE TO FACE ENCOUNTER    Patient Name: Lance Bob  YOB: 1950    PCP: Primary Doctor No   PCP Address: None  PCP Phone Number: None  PCP Fax: None    Encounter Date: 11/04/2020    Admit to Home Health    Diagnoses:  Active Hospital Problems    Diagnosis  POA    Closed compression fracture of L3 lumbar vertebra, initial encounter [S32.030A]  Yes    Vitamin D deficiency [E55.9]  Yes    Left hip pain [M25.552]  Yes    Closed fracture of spinous process of thoracic vertebra with routine healing [S22.008D]  Not Applicable      Resolved Hospital Problems    Diagnosis Date Resolved POA    *Traumatic rhabdomyolysis [T79.6XXA] 11/03/2020 Yes    Discharge planning issues [Z02.9] 11/03/2020 Not Applicable       No future appointments.        I have seen and examined this patient face to face today. My clinical findings that support the need for the home health skilled services and home bound status are the following:  Weakness/numbness causing balance and gait disturbance due to Fracture and Weakness/Debility making it taxing to leave home.    Allergies:Review of patient's allergies indicates:  No Known Allergies    Diet: regular diet    Activities: activity as tolerated    Nursing:   SN to complete comprehensive assessment including routine vital signs. Instruct on disease process and s/s of complications to report to MD. Review/verify medication list sent home with the patient at time of discharge  and instruct patient/caregiver as needed. Frequency may be adjusted depending on start of care date.    Notify MD if SBP > 160 or < 90; DBP > 90 or < 50; HR > 120 or < 50; Temp > 101;      CONSULTS:    Physical Therapy to evaluate and treat. Evaluate for home safety and equipment needs; Establish/upgrade home exercise program. Perform / instruct on therapeutic exercises, gait training, transfer training, and Range of Motion.  Occupational  Therapy to evaluate and treat. Evaluate home environment for safety and equipment needs. Perform/Instruct on transfers, ADL training, ROM, and therapeutic exercises.    MISCELLANEOUS CARE:  N/A    WOUND CARE ORDERS  n/a      Medications: Review discharge medications with patient and family and provide education.      Current Discharge Medication List      START taking these medications    Details   ergocalciferol (ERGOCALCIFEROL) 50,000 unit Cap Take 1 capsule (50,000 Units total) by mouth every 7 days.  Qty: 4 capsule, Refills: 2      HYDROcodone-acetaminophen (NORCO) 7.5-325 mg per tablet Take 1 tablet by mouth every 6 (six) hours as needed for Pain.  Qty: 28 tablet, Refills: 0    Comments: n/a       methocarbamoL (ROBAXIN) 750 MG Tab Take 1 tablet (750 mg total) by mouth every 8 (eight) hours as needed.  Qty: 30 tablet, Refills: 0         CONTINUE these medications which have NOT CHANGED    Details   ibuprofen (ADVIL,MOTRIN) 200 MG tablet Take 400 mg by mouth every 6 (six) hours as needed (headache).      multivitamin (ONE DAILY MULTIVITAMIN) per tablet Take 1 tablet by mouth once daily.             I certify that this patient is confined to his home and needs physical therapy and occupational therapy.

## 2020-11-03 NOTE — PLAN OF CARE
Problem: Occupational Therapy Goal  Goal: Occupational Therapy Goal  Description: Goals to be met by: 11/12/20    Patient will increase functional independence with ADLs by performing:    UE Dressing with Set-up Assistance.  LE Dressing with Set-up Assistance and Assistive Devices as needed.  Grooming while standing at sink with Supervision.  Toileting from toilet with Supervision for hygiene and clothing management.   Supine to sit with Supervision to increase bed mobility independence.  Step transfer with Stand-by Assistance with AD as needed to prepare for household mobility to complete occupations of choice.   Toilet transfer to toilet with Supervision.  Pt and caregiver to don LSO brace I'ly for safe completion of OOB ax's.    Outcome: Ongoing, Progressing     OT goals remain appropriate.    Eligio Fitzgerald, OT   11/03/2020

## 2020-11-03 NOTE — PLAN OF CARE
Therapy recs for home medical equipment:  Bedside commode, bath bench, rolling walker.  CM ordered through [facility].    CM to follow for discharge planning needs.  TANNER Bell RN  Case Management  [phone]

## 2020-11-03 NOTE — ASSESSMENT & PLAN NOTE
Rhabdo is improving but still >9000 on 11/3    Plan  - continue hydration  - Patient will need home health PT/OT  - patient needs follow up with NSGY and work note stating he cannot work until that appointment

## 2020-11-04 VITALS
SYSTOLIC BLOOD PRESSURE: 160 MMHG | HEART RATE: 69 BPM | OXYGEN SATURATION: 100 % | HEIGHT: 62 IN | BODY MASS INDEX: 18.25 KG/M2 | WEIGHT: 99.19 LBS | TEMPERATURE: 98 F | DIASTOLIC BLOOD PRESSURE: 81 MMHG | RESPIRATION RATE: 22 BRPM

## 2020-11-04 PROBLEM — K76.6 PORTAL HYPERTENSION: Status: RESOLVED | Noted: 2020-11-04 | Resolved: 2020-11-04

## 2020-11-04 PROBLEM — I10 HYPERTENSION: Status: ACTIVE | Noted: 2020-11-04

## 2020-11-04 PROBLEM — K76.6 PORTAL HYPERTENSION: Status: ACTIVE | Noted: 2020-11-04

## 2020-11-04 LAB
ALBUMIN SERPL BCP-MCNC: 3.2 G/DL (ref 3.5–5.2)
ALP SERPL-CCNC: 58 U/L (ref 55–135)
ALT SERPL W/O P-5'-P-CCNC: 104 U/L (ref 10–44)
ANION GAP SERPL CALC-SCNC: 9 MMOL/L (ref 8–16)
AST SERPL-CCNC: 238 U/L (ref 10–40)
BASOPHILS # BLD AUTO: 0.02 K/UL (ref 0–0.2)
BASOPHILS NFR BLD: 0.4 % (ref 0–1.9)
BILIRUB SERPL-MCNC: 1.2 MG/DL (ref 0.1–1)
BUN SERPL-MCNC: 6 MG/DL (ref 8–23)
CALCIUM SERPL-MCNC: 8.4 MG/DL (ref 8.7–10.5)
CHLORIDE SERPL-SCNC: 102 MMOL/L (ref 95–110)
CO2 SERPL-SCNC: 27 MMOL/L (ref 23–29)
CREAT SERPL-MCNC: 0.7 MG/DL (ref 0.5–1.4)
DIFFERENTIAL METHOD: ABNORMAL
EOSINOPHIL # BLD AUTO: 0.3 K/UL (ref 0–0.5)
EOSINOPHIL NFR BLD: 5.4 % (ref 0–8)
ERYTHROCYTE [DISTWIDTH] IN BLOOD BY AUTOMATED COUNT: 12.6 % (ref 11.5–14.5)
EST. GFR  (AFRICAN AMERICAN): >60 ML/MIN/1.73 M^2
EST. GFR  (NON AFRICAN AMERICAN): >60 ML/MIN/1.73 M^2
GLUCOSE SERPL-MCNC: 83 MG/DL (ref 70–110)
HCT VFR BLD AUTO: 38.6 % (ref 40–54)
HGB BLD-MCNC: 12.6 G/DL (ref 14–18)
IMM GRANULOCYTES # BLD AUTO: 0.01 K/UL (ref 0–0.04)
IMM GRANULOCYTES NFR BLD AUTO: 0.2 % (ref 0–0.5)
LYMPHOCYTES # BLD AUTO: 1.1 K/UL (ref 1–4.8)
LYMPHOCYTES NFR BLD: 20.9 % (ref 18–48)
MCH RBC QN AUTO: 31.6 PG (ref 27–31)
MCHC RBC AUTO-ENTMCNC: 32.6 G/DL (ref 32–36)
MCV RBC AUTO: 97 FL (ref 82–98)
MONOCYTES # BLD AUTO: 0.6 K/UL (ref 0.3–1)
MONOCYTES NFR BLD: 12.7 % (ref 4–15)
NEUTROPHILS # BLD AUTO: 3 K/UL (ref 1.8–7.7)
NEUTROPHILS NFR BLD: 60.4 % (ref 38–73)
NRBC BLD-RTO: 0 /100 WBC
PHOSPHATE SERPL-MCNC: 3.6 MG/DL (ref 2.7–4.5)
PLATELET # BLD AUTO: 130 K/UL (ref 150–350)
PMV BLD AUTO: 11.1 FL (ref 9.2–12.9)
POTASSIUM SERPL-SCNC: 3.7 MMOL/L (ref 3.5–5.1)
PROT SERPL-MCNC: 5.7 G/DL (ref 6–8.4)
RBC # BLD AUTO: 3.99 M/UL (ref 4.6–6.2)
SODIUM SERPL-SCNC: 138 MMOL/L (ref 136–145)
WBC # BLD AUTO: 5.03 K/UL (ref 3.9–12.7)

## 2020-11-04 PROCEDURE — 99238 PR HOSPITAL DISCHARGE DAY,<30 MIN: ICD-10-PCS | Mod: GC,,, | Performed by: HOSPITALIST

## 2020-11-04 PROCEDURE — 99238 HOSP IP/OBS DSCHRG MGMT 30/<: CPT | Mod: GC,,, | Performed by: HOSPITALIST

## 2020-11-04 PROCEDURE — 80053 COMPREHEN METABOLIC PANEL: CPT

## 2020-11-04 PROCEDURE — 36415 COLL VENOUS BLD VENIPUNCTURE: CPT

## 2020-11-04 PROCEDURE — 85025 COMPLETE CBC W/AUTO DIFF WBC: CPT

## 2020-11-04 PROCEDURE — 25000003 PHARM REV CODE 250: Performed by: STUDENT IN AN ORGANIZED HEALTH CARE EDUCATION/TRAINING PROGRAM

## 2020-11-04 PROCEDURE — 84100 ASSAY OF PHOSPHORUS: CPT

## 2020-11-04 PROCEDURE — 63600175 PHARM REV CODE 636 W HCPCS: Performed by: STUDENT IN AN ORGANIZED HEALTH CARE EDUCATION/TRAINING PROGRAM

## 2020-11-04 RX ORDER — POTASSIUM CHLORIDE 20 MEQ/1
40 TABLET, EXTENDED RELEASE ORAL ONCE
Status: COMPLETED | OUTPATIENT
Start: 2020-11-04 | End: 2020-11-04

## 2020-11-04 RX ORDER — AMLODIPINE BESYLATE 5 MG/1
5 TABLET ORAL DAILY
Qty: 30 TABLET | Refills: 11 | Status: SHIPPED | OUTPATIENT
Start: 2020-11-04 | End: 2021-11-04

## 2020-11-04 RX ORDER — AMOXICILLIN 250 MG
1 CAPSULE ORAL DAILY
Qty: 30 TABLET | Refills: 3 | Status: SHIPPED | OUTPATIENT
Start: 2020-11-04 | End: 2020-11-18

## 2020-11-04 RX ADMIN — HYDROCODONE BITARTRATE AND ACETAMINOPHEN 1 TABLET: 7.5; 325 TABLET ORAL at 12:11

## 2020-11-04 RX ADMIN — SODIUM CHLORIDE, SODIUM LACTATE, POTASSIUM CHLORIDE, AND CALCIUM CHLORIDE: .6; .31; .03; .02 INJECTION, SOLUTION INTRAVENOUS at 02:11

## 2020-11-04 RX ADMIN — LIDOCAINE 1 PATCH: 50 PATCH TOPICAL at 08:11

## 2020-11-04 RX ADMIN — POTASSIUM CHLORIDE 40 MEQ: 1500 TABLET, EXTENDED RELEASE ORAL at 08:11

## 2020-11-04 RX ADMIN — POLYETHYLENE GLYCOL 3350 17 G: 17 POWDER, FOR SOLUTION ORAL at 08:11

## 2020-11-04 NOTE — MEDICAL/APP STUDENT
[facility] [place]  CCU Progress Note      Lance Bob  YOB: 1950  Medical Record Number:  [id number]  Attending Physician:  Austin Call MD   Date of Admission: 11/1/2020       Hospital Day:  2  Current Principal Problem:  Traumatic rhabdomyolysis      History     Cc: Pain secondary to trauma(tree falling)    HPI  70 year old male with no known significant medical history presents after a tree fell on to his back. He is complaining of back pain and hip pain which is 10/10. This began immediately after the tree fell onto him. He denies head trama, loss of consciousness. Denies any infective symptoms. Denies numbness, tingling, loss of bladder or bowel function.   In the ED he was hemodynamically stable and afebrile. Imaging revealed fracture of T6 and L2 fractures. Neurosurgery was consulted and have no neurosurgical intervention planned at this time. He was admitted to hospital medicine for pain management.       69 yo M admitted for l2 and l3 fracture after a tree fell on his back. Neurosurgery consulted, deferred surgical tx, so treated with PT, brace, and pain control. Found to have rhabdo, so treated with aggressive hydration, trending CPK. CPK improved after one day of hydration. Will discharge with home health for PT/OT and neurosurgery follow up in 6 weeks. Patient given note for work with lifting restrictions.    Interval History:   Pt reports significant improvement today. Pain is well controlled with medication. Pt is passing bowel movements. Spirometry is bedside.       Medications  Scheduled Meds:   enoxaparin  40 mg Subcutaneous Q24H    ergocalciferol  50,000 Units Oral Q7 Days    lidocaine  1 patch Transdermal Daily    polyethylene glycol  17 g Oral Daily     Continuous Infusions:  PRN Meds:.acetaminophen, dextrose 50%, dextrose 50%, glucagon (human recombinant), glucose, glucose, HYDROcodone-acetaminophen, methocarbamoL, morphine, naloxone, ondansetron,  senna-docusate 8.6-50 mg, sodium chloride 0.9%      PSFH:  Please see admission note and assessment and plan below.      ROS   Admits Denies   Constitutional  Chills, diaphoresis   Eyes  Visual changes   ENMT  Dysphagia, Epistaxis, nasal congestion, hearing loss   Cardiovascular  Chest pain, palpitations, edema   Respiratory  Cough, dyspnea, wheezing   Gastrointestinal  Nausea, vomiting, constipation, diarrhea, anorexia.     Genitourinary  Dysuria, incontinence   Musculoskeletal Hip pain, L knee pain, decreased range of movement due to pain    Integumentary  Rash, inflammation, burning   Neruo-Psychiatric Decreased sleep due to pain  Anxiety.  Changes in speech, strength, sensation.     Endocrine     Hematologic  Abnormal bruising, bleeding   Immunologic  Inflammation, pain at IV sites.  Pruritis.         Physical Examination     General:  Comfortable in bed.     Vital Signs  Vitals  Temp: 98.1 °F (36.7 °C)  Temp src: Oral  Pulse: 69  Heart Rate Source: Monitor  Resp: (!) 22  SpO2: 100 %  Pulse Oximetry Type: Continuous  O2 Device (Oxygen Therapy): room air  BP: (!) 160/81  MAP (mmHg): 114  BP Location: Right arm  BP Method: Automatic  Patient Position: Lying          24 Hour VS Range    Temp:  [97.8 °F (36.6 °C)-98.5 °F (36.9 °C)]   Pulse:  [61-81]   Resp:  [16-27]   BP: (137-161)/(67-81)   SpO2:  [96 %-100 %]     Intake/Output Summary (Last 24 hours) at 11/4/2020 1324  Last data filed at 11/4/2020 0700  Gross per 24 hour   Intake 2945.42 ml   Output 1925 ml   Net 1020.42 ml       Physical Exam   Constitutional: He is oriented to person, place, and time and well-developed, well-nourished, and in no distress.   HENT:   Head: Normocephalic and atraumatic.   Eyes: Pupils are equal, round, and reactive to light. Conjunctivae and EOM are normal.   Neck: No JVD present.   Cardiovascular: Normal rate, regular rhythm and normal heart sounds.   Pulmonary/Chest: Effort normal. No stridor. No respiratory distress.    Abdominal: Bowel sounds are normal.   Musculoskeletal:         General: Tenderness present.      Right shoulder: He exhibits decreased range of motion, swelling and pain. He exhibits no effusion.   Neurological: He is alert and oriented to person, place, and time.   Skin: Skin is warm and dry.   Psychiatric: Mood, memory and affect normal.                   Data       Recent Labs   Lab 11/01/20 1850 11/01/20  1900 11/02/20 0605 11/03/20 0519 11/04/20  0318   WBC 10.57  --  5.95 5.69 5.03   HGB 13.6*  --  13.7* 12.8* 12.6*   HCT 41.2 40 41.6 38.9* 38.6*     --  147* 131* 130*        Recent Labs   Lab 11/01/20 1850   INR 1.0        Recent Labs   Lab 11/01/20 1850 11/02/20 0605 11/02/20  2135 11/03/20 0519 11/03/20 0756 11/04/20 0318    141  --  137  --  138   K 4.0 3.6 3.9 4.7 4.0 3.7    109  --  101  --  102   CO2 21* 20*  --  28  --  27   BUN 18 15  --  11  --  6*   CREATININE 1.0 0.8  --  0.8  --  0.7   ANIONGAP 13 12  --  8  --  9   CALCIUM 9.0 8.4*  --  8.7  --  8.4*        Recent Labs   Lab 11/01/20 1850 11/02/20 0605 11/03/20 0519 11/04/20  0318   PROT 7.1 6.5 6.0 5.7*   ALBUMIN 4.2 3.8 3.4* 3.2*   BILITOT 0.7 1.3* 1.7* 1.2*   ALKPHOS 76 73 61 58   AST 68* 391* 283* 238*   ALT 36 114* 105* 104*        Imaging Results           MRI Lumbar Spine W WO Cont (Final result)  Result time 11/02/20 01:54:30    Final result by Nuria Esparza MD (11/02/20 01:54:30)                 Impression:      Superior endplate mild compression fracture of the L3 vertebra with mild wedging to the right.  No evidence of fracture retropulsion, canal hematoma or vertebral body listhesis is seen as result.  There is no visible extension a fracture into the posterior elements.    The left transverse process fracture at L2 is not very well demonstrated with MRI without fat saturation in the axial plane.  There is no significant marrow enhancing lesions to support a pathologic fracture related to a  mass/neoplasm with certainty.  The fracture could explained by trauma particularly with an adjacent vertebral body compression.  Follow-up is suggested and additional sequences with fat saturation suggested at the time of follow-up.    No suspicious marrow lesions or enhancing lesions in the vertebral bodies are noted as imaged.    Multilevel spondylosis without canal or significant neural foraminal stenosis.  Detailed findings are described above.    Paraspinous pelvic floor muscular edematous changes on the left are likely post-traumatic on the left and are partially visualized.    Multiple renal cystic foci with several incompletely imaged.  These findings are better characterized on the abdomen pelvis CT 11/01/2020.    This report was flagged in Epic as abnormal.      Electronically signed by: Nuria Esparza  Date:    11/02/2020  Time:    01:54             Narrative:    EXAMINATION:  MRI LUMBAR SPINE W WO CONTRAST    CLINICAL HISTORY:  L/S-spine fracture, pathological; patient post trauma and fall with back injury and L2 left transverse process fracture.    TECHNIQUE:  Multiplanar, multisequence MR images were acquired from the thoracolumbar junction to the sacrum without and with IV injection of 6 cc of Gadavist contrast.    COMPARISON:  CTA of the lumbar spine 11/01/2020    FINDINGS:  Alignment: There is no subluxation.    Vertebrae: Note is made of a superior endplate compression fracture of the L3 vertebral body more pronounced on the right side extending towards the base of the right pedicle.  There is right-sided mild wedge deformity and cortical irregularity with relative levoscoliosis noted on the prior CT as result.  There is no retropulsion into the spinal canal or significant listhesis.    There is no evidence of fracture extension into the right pedicle or posterior elements of the vertebra L3.    The L2 transverse process fracture as noted by CT is seen to a limited degree due to the lack of fat  saturation on the axial series but it is present with expected enhancement of the surrounding soft tissues related to edema/posttraumatic changes.  The presence of a definitive soft tissue enhancing mass to suggest pathologic fracture is not confirmed as imaged.  There is also limited visualization of the transverse process fracture on the left on sagittal imaging with fat saturation limiting characterization.    There are no other marrow signal abnormalities throughout the lumbar vertebrae.    Cord: Normal signal in the visualized distal spinal cord is noted.  The conus terminates at the T12-L1 disc level    Degenerative findings:    T12-L1: No dorsal focal disc abnormality or stenosis.    L1-L2: There is moderate spondylosis with broad-based small dorsal disc protrusion more pronounced in the left foraminal region.  There is no canal stenosis or significant neural foraminal stenosis.    L2-L3: B mild-to-moderate spondylosis with broad-based small dorsal annular bulge/protrusion is noted.  There is no canal or neural foraminal stenosis.  Facet hypertrophic changes are present.    L3-L4: There is minor dorsal annular bulging without significant disc protrusion or canal or neural foraminal stenosis.  Mild facet hypertrophy bilaterally is noted.    L4-L5: There is broad-based small disc protrusion in the canal which extends to the neural foramina bilaterally.  There is no canal stenosis.  Facet and ligamentous mild bilateral hypertrophy is noted contributing to mild bilateral inferior neural foraminal stenosis.    L5-S1: There is moderate spondylosis and a small broad-based dorsal disc protrusion.  There is no canal stenosis or significant neural foraminal stenosis.  Mild to moderate bilateral facet hypertrophic changes are present.    Sacroiliac joint degenerative changes are seen.    Paraspinous soft tissues and musculature are remarkable for muscular edematous changes in the left pelvic floor musculature which is  only seen on a few slices of the sagittal series 7 images 11 through 16.    Multiple cystic foci in the kidneys with dominant lesion on the right noted with several incompletely visualized.                                CT Cervical Spine Without Contrast (Final result)  Result time 11/01/20 22:09:59    Final result by Sudhakar Weiner MD (11/01/20 22:09:59)                 Impression:      Acute nondisplaced fracture of the T6 spinous process.    Lytic lesion centered in the left L2 transverse process with suspected pathological fracture of the left transverse process at L2.  MRI of the lumbar spine without and with contrast is suggested for further evaluation.    Multilevel cervical spondylolysis with likely moderate canal stenosis at C3-4 and C5-6.    Mild multilevel lumbar spondylosis with no significant spinal canal stenosis.    This report was flagged in Epic as abnormal.    Electronically signed by resident: Mukesh Mtz  Date:    11/01/2020  Time:    21:29    Electronically signed by: Sudhakar Weiner MD  Date:    11/01/2020  Time:    22:09             Narrative:    EXAMINATION:  CT CERVICAL SPINE WITHOUT CONTRAST; CT LUMBAR SPINE WITHOUT CONTRAST; CT THORACIC SPINE WITHOUT CONTRAST    CLINICAL HISTORY:  Neck trauma (Age => 64y);; Back pain or radiculopathy, trauma;; Mid-back trauma;    TECHNIQUE:  Multiplanar CT images of the cervical, thoracic, and lumbar spine were performed without the administration of contrast.    COMPARISON:  CT chest abdomen pelvis 11/01/2020 and CT scan abdomen pelvis dated 05/21/2015.    FINDINGS:  CERVICAL    Alignment: Normal.    Vertebrae: Vertebral body heights are well maintained.  No evidence of fractures.    Skull base and craniocervical junction: Unremarkable    Multilevel cervical spondylolysis with subchondral cystic change, endplate erosion, uncovertebral spurring, facet arthropathy, and posterior osteophyte disc complexes.  Limited evaluation of intraspinal compartment  reveals likely moderate canal stenosis at C3-4 and C5-6.  Variable degrees of foraminal narrowing    THORACIC    Normal kyphotic alignment of the thoracic spine.  Mild S-shaped scoliosis of the thoracolumbar spine.    Mild inferior endplate irregularity at T5 through T10, likely Schmorl's nodes.  Vertebral body heights are otherwise well maintained.    Acute nondisplaced fracture of the T6 spinous process (series 602, image 61).    No significant spinal canal stenosis or neural foraminal narrowing.    LUMBAR    Alignment: Normal.    Vertebral body heights are well maintained.  There is desiccation of the discs at L4-5 and L5-S1 consistent with degenerative change.  There is a lytic lesion centered in the left transverse process at L2.  There is suspected pathological fracture of the left L2 transverse process.    Degenerative findings:    Mild multilevel lumbar spondylosis with mild facet arthropathy, anterior osteophytes, and posterior disc bulges.  No significant spinal canal stenosis.  Variable neural foraminal narrowing    Paraspinal muscles & soft tissues: See same day chest abdomen pelvis report for detailed organ evaluation of the thorax and abdominopelvic compartments.                                CT Thoracic Spine Without Contrast (Final result)  Result time 11/01/20 22:09:59    Final result by Sudhakar Weiner MD (11/01/20 22:09:59)                 Impression:      Acute nondisplaced fracture of the T6 spinous process.    Lytic lesion centered in the left L2 transverse process with suspected pathological fracture of the left transverse process at L2.  MRI of the lumbar spine without and with contrast is suggested for further evaluation.    Multilevel cervical spondylolysis with likely moderate canal stenosis at C3-4 and C5-6.    Mild multilevel lumbar spondylosis with no significant spinal canal stenosis.    This report was flagged in Epic as abnormal.    Electronically signed by resident: Mukesh  Stubenrauch  Date:    11/01/2020  Time:    21:29    Electronically signed by: Sudhakar Weiner MD  Date:    11/01/2020  Time:    22:09             Narrative:    EXAMINATION:  CT CERVICAL SPINE WITHOUT CONTRAST; CT LUMBAR SPINE WITHOUT CONTRAST; CT THORACIC SPINE WITHOUT CONTRAST    CLINICAL HISTORY:  Neck trauma (Age => 64y);; Back pain or radiculopathy, trauma;; Mid-back trauma;    TECHNIQUE:  Multiplanar CT images of the cervical, thoracic, and lumbar spine were performed without the administration of contrast.    COMPARISON:  CT chest abdomen pelvis 11/01/2020 and CT scan abdomen pelvis dated 05/21/2015.    FINDINGS:  CERVICAL    Alignment: Normal.    Vertebrae: Vertebral body heights are well maintained.  No evidence of fractures.    Skull base and craniocervical junction: Unremarkable    Multilevel cervical spondylolysis with subchondral cystic change, endplate erosion, uncovertebral spurring, facet arthropathy, and posterior osteophyte disc complexes.  Limited evaluation of intraspinal compartment reveals likely moderate canal stenosis at C3-4 and C5-6.  Variable degrees of foraminal narrowing    THORACIC    Normal kyphotic alignment of the thoracic spine.  Mild S-shaped scoliosis of the thoracolumbar spine.    Mild inferior endplate irregularity at T5 through T10, likely Schmorl's nodes.  Vertebral body heights are otherwise well maintained.    Acute nondisplaced fracture of the T6 spinous process (series 602, image 61).    No significant spinal canal stenosis or neural foraminal narrowing.    LUMBAR    Alignment: Normal.    Vertebral body heights are well maintained.  There is desiccation of the discs at L4-5 and L5-S1 consistent with degenerative change.  There is a lytic lesion centered in the left transverse process at L2.  There is suspected pathological fracture of the left L2 transverse process.    Degenerative findings:    Mild multilevel lumbar spondylosis with mild facet arthropathy, anterior  osteophytes, and posterior disc bulges.  No significant spinal canal stenosis.  Variable neural foraminal narrowing    Paraspinal muscles & soft tissues: See same day chest abdomen pelvis report for detailed organ evaluation of the thorax and abdominopelvic compartments.                                CT Lumbar Spine Without Contrast (Final result)  Result time 11/01/20 22:09:59    Final result by Sudhakar Weiner MD (11/01/20 22:09:59)                 Impression:      Acute nondisplaced fracture of the T6 spinous process.    Lytic lesion centered in the left L2 transverse process with suspected pathological fracture of the left transverse process at L2.  MRI of the lumbar spine without and with contrast is suggested for further evaluation.    Multilevel cervical spondylolysis with likely moderate canal stenosis at C3-4 and C5-6.    Mild multilevel lumbar spondylosis with no significant spinal canal stenosis.    This report was flagged in Epic as abnormal.    Electronically signed by resident: Mukesh Mtz  Date:    11/01/2020  Time:    21:29    Electronically signed by: Sudhakar Weiner MD  Date:    11/01/2020  Time:    22:09             Narrative:    EXAMINATION:  CT CERVICAL SPINE WITHOUT CONTRAST; CT LUMBAR SPINE WITHOUT CONTRAST; CT THORACIC SPINE WITHOUT CONTRAST    CLINICAL HISTORY:  Neck trauma (Age => 64y);; Back pain or radiculopathy, trauma;; Mid-back trauma;    TECHNIQUE:  Multiplanar CT images of the cervical, thoracic, and lumbar spine were performed without the administration of contrast.    COMPARISON:  CT chest abdomen pelvis 11/01/2020 and CT scan abdomen pelvis dated 05/21/2015.    FINDINGS:  CERVICAL    Alignment: Normal.    Vertebrae: Vertebral body heights are well maintained.  No evidence of fractures.    Skull base and craniocervical junction: Unremarkable    Multilevel cervical spondylolysis with subchondral cystic change, endplate erosion, uncovertebral spurring, facet arthropathy, and  posterior osteophyte disc complexes.  Limited evaluation of intraspinal compartment reveals likely moderate canal stenosis at C3-4 and C5-6.  Variable degrees of foraminal narrowing    THORACIC    Normal kyphotic alignment of the thoracic spine.  Mild S-shaped scoliosis of the thoracolumbar spine.    Mild inferior endplate irregularity at T5 through T10, likely Schmorl's nodes.  Vertebral body heights are otherwise well maintained.    Acute nondisplaced fracture of the T6 spinous process (series 602, image 61).    No significant spinal canal stenosis or neural foraminal narrowing.    LUMBAR    Alignment: Normal.    Vertebral body heights are well maintained.  There is desiccation of the discs at L4-5 and L5-S1 consistent with degenerative change.  There is a lytic lesion centered in the left transverse process at L2.  There is suspected pathological fracture of the left L2 transverse process.    Degenerative findings:    Mild multilevel lumbar spondylosis with mild facet arthropathy, anterior osteophytes, and posterior disc bulges.  No significant spinal canal stenosis.  Variable neural foraminal narrowing    Paraspinal muscles & soft tissues: See same day chest abdomen pelvis report for detailed organ evaluation of the thorax and abdominopelvic compartments.                               CT Chest Abdomen Pelvis With Contrast (Final result)  Result time 11/01/20 22:16:11    Final result by Abel Salas MD (11/01/20 22:16:11)                 Impression:      No acute intrathoracic or abdominopelvic pathology.    Colonic diverticulosis without evidence of diverticulitis.    Other stable findings as above.    Electronically signed by resident: Mukesh Mtz  Date:    11/01/2020  Time:    21:18    Electronically signed by: Abel Salas  Date:    11/01/2020  Time:    22:16             Narrative:    EXAMINATION:  CT CHEST ABDOMEN PELVIS WITH CONTRAST (XPD)    CLINICAL HISTORY:  Chest-abdomen-pelvis trauma,  blunt;    TECHNIQUE:  The patient was surveyed from the lung apices through the pelvis after the administration of 75 cc Omni 350 IV contrast..  Data was reconstructed for coronal, sagittal, and axial images.    COMPARISON:  CT abdomen pelvis 05/21/2015    CT chest 11/06/2018    FINDINGS:  CHEST:    Neck and thoracic soft tissues: unremarkable.    Aorta:A left sided aortic arch with 3 branch vessels.  The thoracic aorta maintains normal caliber, contour, and course.  No significant atherosclerotic calcification.    Heart: The heart is not enlarged. No pericardial effusion .    Airways: The trachea is midline and proximal airways are patent.    Lungs/Pleura: Lungs are symmetrically expanded.  No evidence of consolidation, mass, significant pleural thickening, or pleural fluid.    Hilum/Mediastinum: no axillary or mediastinal lymph node enlargement.    Esophagus: The esophagus maintains a normal course and caliber.    ABDOMEN/PELVIS:    Liver: The liver is normal in size and attenuation.  No focal hepatic abnormality is seen.  Portal veins are patent.    Gallbladder/Biliary System: the gallbladder is unremarkable.  No intrahepatic or extrahepatic biliary ductal dilatation is noted.    Stomach: Unremarkable    Spleen: Unremarkable    Pancreas: Unremarkable    Adrenal glands: Unremarkable    Renal and Urinary system: Right kidney appears slightly atrophic when compared to left.  Stable appearance of bilateral simple renal cysts, largest of which measures 3.9 cm on the right kidney.  No evidence of renal stones.Kidneys concentrate contrast appropriately.  No hydronephrosis.  The ureters appear normal in course and caliber without evidence of ureteral dilatation. Urinary bladder wall is thickened likely due to chronic outlet obstruction.    Reproductive: Prostatomegaly    Bowel: The visualized loops of small and large bowel show no evidence of obstruction or inflammation.  Appendix is visualized without evidence of  obstruction or surrounding inflammatory change.  There scattered colonic diverticuli.    Peritoneum: No ascites, free fluid, or intraperitoneal free air.    Lymph Nodes: No pathologic chase enlargement in the abdomen or pelvis.    Aorta: the abdominal aorta is normal in course and caliber.  Mild calcific and non-calcific atherosclerotic plaque.    Bones: Degenerative changes of the spine.  No evidence of acute fracture or osseus destructive process.    Soft tissues: unremarkable.                               X-Ray Femur AP/LAT Left (Final result)  Result time 11/01/20 19:13:15    Final result by Joaquín Rebolledo MD (11/01/20 19:13:15)                 Impression:      1. No acute displaced fracture or dislocation of the pelvis or left femur.      Electronically signed by: Joaquín Rebolledo MD  Date:    11/01/2020  Time:    19:13             Narrative:    EXAMINATION:  XR PELVIS ROUTINE AP; XR FEMUR 2 VIEW LEFT    CLINICAL HISTORY:  fall;  Unspecified fall, initial encounter    TECHNIQUE:  AP view of the pelvis was performed.  Four views left femur.    COMPARISON:  CT 05/21/2015    FINDINGS:  Single-view pelvis, four views left femur.    The bilateral sacroiliac joints are intact.  The pubic symphysis is intact.  The bilateral femoroacetabular joints are intact.  There is osteopenia.  There is remote irregularly healed injury of the mid aspect of the left femoral diaphysis.  Prominent degenerative changes are noted of the knee.  No large knee joint effusion.                               X-Ray Pelvis Routine AP (Final result)  Result time 11/01/20 19:13:15   Procedure changed from X-Ray Hip 2 View Left     Final result by Joaquín Rebolledo MD (11/01/20 19:13:15)                 Impression:      1. No acute displaced fracture or dislocation of the pelvis or left femur.      Electronically signed by: Joaquín Rebolledo MD  Date:    11/01/2020  Time:    19:13             Narrative:    EXAMINATION:  XR PELVIS ROUTINE AP;  XR FEMUR 2 VIEW LEFT    CLINICAL HISTORY:  fall;  Unspecified fall, initial encounter    TECHNIQUE:  AP view of the pelvis was performed.  Four views left femur.    COMPARISON:  CT 05/21/2015    FINDINGS:  Single-view pelvis, four views left femur.    The bilateral sacroiliac joints are intact.  The pubic symphysis is intact.  The bilateral femoroacetabular joints are intact.  There is osteopenia.  There is remote irregularly healed injury of the mid aspect of the left femoral diaphysis.  Prominent degenerative changes are noted of the knee.  No large knee joint effusion.                                      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 Unable to walk due to pain.  CT findings of T6 and L2 fractures, with possible lytic lesion at L2  MRI to evaluate lytic lesion - The presence of a definitive soft tissue enhancing mass to suggest pathologic fracture is not confirmed as imaged.     Plan  - pain management Norco 7.5 Q6 PRN, morphine 4mg IV Q6 for breakthrough, methocarbamol 750 q8 PRN, tylenol PRN, lidocaine patch    Rhabdomyolysis   Increased CK to >16,000 on arrival. UA showed +blood no RBCs indicating rhabdomyolysis. Fluids 200mL/kg/hr given yesterday.     Smoking   Patient has a long standing history of smoking tobacco. Current smoker, 5 cigarettes a day. Pt states he wants to stop smoking, wants to use this hospitalization as an opportunity to stop.  Does not want a patch.   Plan:   - Encourage smoking cessation   - Incentive spirometry while in patient  - outpatient smoking cessation referral    Disposition        Plan       - PT/OT recs include going home with home health        - Pt cleared to work, no heavy lifting for 6 weeks.       VTE Risk Mitigation (From admission, onward)         Ordered      enoxaparin injection 40 mg  Every 24 hours      11/02/20 0526     IP VTE HIGH RISK PATIENT  Once      11/02/20 0525     Place sequential compression device  Until discontinued      11/02/20 0525     Place sequential compression device  Until discontinued      11/02/20 0444                [facility]  Medical Student  [facility] [place]

## 2020-11-04 NOTE — NURSING
Patient discharged home with all belongings- stable. Prescriptions were brought to bedside. Discharge instructions reviewed with patient and wife- verbalizes understanding,

## 2020-11-04 NOTE — DISCHARGE SUMMARY
"[facility]   Hospital Medicine  Discharge Summary      Patient Name: Lance Bob  MRN: [id number]  Admission Date: 11/1/2020  Hospital Length of Stay: 2 days  Discharge Date and Time:  11/04/2020 12:59 PM  Attending Physician: Austin Call MD   Discharging Provider: Laura Lora MD  Primary Care Provider: Primary Doctor No      HPI:   70 year old male with no known significant medical history presents after a tree fell on to his back. He is complaining of back pain and hip pain which is 10/10. This began immediately after the tree fell onto him. He denies head trama, loss of consciousness. Denies any infective symptoms. Denies numbness, tingling, loss of bladder or bowel function.   In the ED he was hemodynamically stable and afebrile. Imaging revealed fracture of T6 and L2 fractures. Neurosurgery was consulted and have no neurosurgical intervention planned at this time. He was admitted to hospital medicine for pain management.      * No surgery found *      Hospital Course:   71 yo M admitted for l2 and l3 fracture after a tree fell on his back. Neurosurgery consulted, deferred surgical tx, so treated with PT, brace, and pain control. Found to have rhabdo, so treated with aggressive hydration, trending CPK. CPK improved after one day of hydration. Will discharge with home health for PT/OT and neurosurgery follow up in 6 weeks. Patient given note for work with lifting restrictions.     BP (!) 160/81 (BP Location: Right arm, Patient Position: Lying)   Pulse 69   Temp 98.1 °F (36.7 °C) (Oral)   Resp (!) 22   Ht 5' 2" (1.575 m)   Wt 45 kg (99 lb 3.3 oz)   SpO2 100%   BMI 18.15 kg/m²   Physical Exam  Vitals signs and nursing note reviewed.   Constitutional:       Appearance: Normal appearance.   HENT:      Mouth/Throat:      Mouth: Mucous membranes are moist.   Eyes:      Pupils: Pupils are equal, round, and reactive to light.   Cardiovascular:      Rate and Rhythm: Normal rate " and regular rhythm.      Heart sounds: No murmur.   Pulmonary:      Effort: Pulmonary effort is normal. No respiratory distress.      Breath sounds: Normal breath sounds. No rales.   Abdominal:      General: Abdomen is flat. There is no distension.      Palpations: Abdomen is soft.      Tenderness: There is no abdominal tenderness.   Musculoskeletal:         General: Deformity and signs of injury (spinal fracture) present.      Comments: Patient cannot move out of bed without pain. States when working with PT/OT he will walk down the [place] with a walker   Skin:     General: Skin is warm and dry.   Neurological:      General: No focal deficit present.      Mental Status: He is alert and oriented to person, place, and time. Mental status is at baseline.      Cranial Nerves: No cranial nerve deficit.   Psychiatric:         Mood and Affect: Mood normal.         Behavior: Behavior normal.         Thought Content: Thought content normal.      Consults:   Consults (From admission, onward)        Status Ordering Provider     Inpatient consult to Neurosurgery  Once     Provider:  (Not yet assigned)    OLIVIA Sow          Hypertension  Patient with elevated BP throughout admission, so started on amlodipine 5mg upon discharge. Arranged for PCP follow up.      Vitamin D deficiency  Patient's vit D is 19    Plan  - ergocalciferol 81028 q weekly      Closed fracture of spinous process of thoracic vertebra with routine healing  71 y/o M  who had a tree fall on him ( was cutting trees)  found to have T6 spinous process fx and left L2 TP fx with no neurological deficits. Unable to walk due to pain.  CT findings of T6 and L2 fractures, with possible lytic lesion at L2  MRI to evaluate lytic lesion - The presence of a definitive soft tissue enhancing mass to suggest pathologic fracture is not confirmed as imaged    Plan  - pain management Norco 7.5 Q6 PRN, morphine 4mg IV Q6 for breakthrough, methocarbamol 750 q8 PRN,  tylenol PRN, lidocaine patch  - narcan reversal ordered  - PT/OT evaluation, recommend home with HH  - neurosurgery consulted in ED, no intervention at this time, will see patient in follow up  - q4 neuro checks  - fall precautions          Final Active Diagnoses:    Diagnosis Date Noted POA    Hypertension [I10] 11/04/2020 Unknown    Closed compression fracture of L3 lumbar vertebra, initial encounter [S32.030A] 11/03/2020 Yes    Vitamin D deficiency [E55.9] 11/03/2020 Yes    Left hip pain [M25.552] 11/02/2020 Yes    Closed fracture of spinous process of thoracic vertebra with routine healing [S22.008D] 11/01/2020 Not Applicable      Problems Resolved During this Admission:    Diagnosis Date Noted Date Resolved POA    PRINCIPAL PROBLEM:  Traumatic rhabdomyolysis [T79.6XXA] 11/02/2020 11/03/2020 Yes    Portal hypertension [K76.6] 11/04/2020 11/04/2020 Unknown    Discharge planning issues [Z02.9] 11/03/2020 11/03/2020 Not Applicable       Discharged Condition: stable    Disposition: Home-Health Care c    Follow Up:  Follow-up Information     [facility].    Specialty: Home Health Services  Why: Home Health agency will call patient upon discharge.  Contact information:  [address]  [address]  [facility] [address]  [phone]             [facility] - Neuro[facility] 7th Fl In 6 weeks.    Specialty: Neurosurgery  Why: l2, l3 compression fracture  Contact information:  [address]  [facility] [address] [phone]  Additional information:  7th Floor           [facility] In 1 week.    Specialty: Internal Medicine  Why: BP control, started on amlodipine 5 at discharge - see scheduled new pt follow up appointment with Dr. Lexa Jon  Contact information:  [address]  [facility] [address] [phone]  Additional information:  [facility]   Please park in surface lot and check in at central  "registration desk               Patient Instructions:      WALKER FOR HOME USE     Order Specific Question Answer Comments   Type of Walker: Adult (5'4"-6'6")    With wheels? Yes    Height: 5' 2" (1.575 m)    Weight: 45 kg (99 lb 3.3 oz)    Length of need (1-99 months): 99    Does patient have medical equipment at home? none    Please check all that apply: Patient's condition impairs ambulation.    Please check all that apply: Patient is unable to safely ambulate without equipment.    Vendor: [facility]    Expected Date of Delivery: 11/4/2020      COMMODE FOR HOME USE     Order Specific Question Answer Comments   Type: Standard    Height: 5' 2" (1.575 m)    Weight: 45 kg (99 lb 3.3 oz)    Does patient have medical equipment at home? none    Length of need (1-99 months): 99    Vendor: [facility]    Expected Date of Delivery: 11/4/2020      TRANSFER TUB BENCH FOR HOME USE     Order Specific Question Answer Comments   Type of Transfer Tub Bench: Unpadded    Height: 5' 2" (1.575 m)    Weight: 45 kg (99 lb 3.3 oz)    Does patient have medical equipment at home? none    Length of need (1-99 months): 99    Patient notified - Not covered by insurance considered a convenience item Yes    Discussed financial responsibility with responsible party Yes    Vendor: Other (use comments)    Expected Date of Delivery: 11/4/2020      Ambulatory referral/consult to Smoking Cessation Program   Standing Status: Future   Referral Priority: Routine Referral Type: Consultation   Referral Reason: Specialty Services Required   Requested Specialty: CTTS   Number of Visits Requested: 1     Ambulatory referral/consult to Neurosurgery   Standing Status: Future   Referral Priority: Routine Referral Type: Consultation   Referral Reason: Specialty Services Required   Requested Specialty: Neurosurgery   Number of Visits Requested: 1     Lifting restrictions     Notify your health care provider if you experience any of the following:  severe " uncontrolled pain     Activity as tolerated       Significant Diagnostic Studies: Labs:   CMP   Recent Labs   Lab 11/03/20  0519 11/03/20 0756 11/04/20 0318     --  138   K 4.7 4.0 3.7     --  102   CO2 28  --  27   GLU 82  --  83   BUN 11  --  6*   CREATININE 0.8  --  0.7   CALCIUM 8.7  --  8.4*   PROT 6.0  --  5.7*   ALBUMIN 3.4*  --  3.2*   BILITOT 1.7*  --  1.2*   ALKPHOS 61  --  58   *  --  238*   *  --  104*   ANIONGAP 8  --  9   ESTGFRAFRICA >60.0  --  >60.0   EGFRNONAA >60.0  --  >60.0   , CBC   Recent Labs   Lab 11/03/20 0519 11/04/20 0318   WBC 5.69 5.03   HGB 12.8* 12.6*   HCT 38.9* 38.6*   * 130*    and All labs within the past 24 hours have been reviewed    MRI l-spine 11/2/20  Impression:       Superior endplate mild compression fracture of the L3 vertebra with mild wedging to the right.  No evidence of fracture retropulsion, canal hematoma or vertebral body listhesis is seen as result.  There is no visible extension a fracture into the posterior elements.     The left transverse process fracture at L2 is not very well demonstrated with MRI without fat saturation in the axial plane.  There is no significant marrow enhancing lesions to support a pathologic fracture related to a mass/neoplasm with certainty.  The fracture could explained by trauma particularly with an adjacent vertebral body compression.  Follow-up is suggested and additional sequences with fat saturation suggested at the time of follow-up.     No suspicious marrow lesions or enhancing lesions in the vertebral bodies are noted as imaged.     Multilevel spondylosis without canal or significant neural foraminal stenosis.  Detailed findings are described above.     Paraspinous pelvic floor muscular edematous changes on the left are likely post-traumatic on the left and are partially visualized.        Pending Diagnostic Studies:     None         Medications:  Reconciled Home Medications:      Medication  List      START taking these medications    amLODIPine 5 MG tablet  Commonly known as: NORVASC  Take 1 tablet (5 mg total) by mouth once daily.     ergocalciferol 50,000 unit Cap  Commonly known as: ERGOCALCIFEROL  Take 1 capsule (50,000 Units total) by mouth every 7 days.     HYDROcodone-acetaminophen 7.5-325 mg per tablet  Commonly known as: NORCO  Take 1 tablet by mouth every 6 (six) hours as needed for Pain.     methocarbamoL 750 MG Tab  Commonly known as: ROBAXIN  Take 1 tablet (750 mg total) by mouth every 8 (eight) hours as needed.     senna-docusate 8.6-50 mg 8.6-50 mg per tablet  Commonly known as: PERICOLACE  Take 1 tablet by mouth once daily.        CONTINUE taking these medications    ibuprofen 200 MG tablet  Commonly known as: ADVIL,MOTRIN  Take 400 mg by mouth every 6 (six) hours as needed (headache).     ONE DAILY MULTIVITAMIN per tablet  Generic drug: multivitamin  Take 1 tablet by mouth once daily.            Indwelling Lines/Drains at time of discharge:   Lines/Drains/Airways     None                 Time spent on the discharge of patient: 35 minutes  Patient was seen and examined on the date of discharge and determined to be suitable for discharge.         Laura Lora MD   PGY1  Department of Hospital Medicine  [facility]

## 2020-11-04 NOTE — ASSESSMENT & PLAN NOTE
Patient with elevated BP throughout admission, so started on amlodipine 5mg upon discharge. Arranged for PCP follow up.

## 2020-11-04 NOTE — PLAN OF CARE
CM to pt's bedside to discuss discharge planning needs.  CHRISTI scheduled a post hospital follow up new pt appointment with:  Dr. Lexa Mockwood - Internal Medicine   [address] [facility] [address] [phone]    Pt in agreement to follow up with his appointment.      CM informed pt his rolling walker will be delivered bedside for discharge home. Home health will contact the pt to admit to  services for home SN and therapy. Verbalized understanding.    CM updated pt's nurse Mayelin.    CM to follow for discharge planning needs.  JOSHUA BellN RN  Case Management  [phone]

## 2020-11-04 NOTE — ASSESSMENT & PLAN NOTE
71 y/o M  who had a tree fall on him ( was cutting trees)  found to have T6 spinous process fx and left L2 TP fx with no neurological deficits. Unable to walk due to pain.  CT findings of T6 and L2 fractures, with possible lytic lesion at L2  MRI to evaluate lytic lesion - The presence of a definitive soft tissue enhancing mass to suggest pathologic fracture is not confirmed as imaged    Plan  - pain management Norco 7.5 Q6 PRN, morphine 4mg IV Q6 for breakthrough, methocarbamol 750 q8 PRN, tylenol PRN, lidocaine patch  - narcan reversal ordered  - PT/OT evaluation, recommend home with HH  - neurosurgery consulted in ED, no intervention at this time, will see patient in follow up  - q4 neuro checks  - fall precautions

## 2020-11-04 NOTE — PLAN OF CARE
Pt alert and oriented x4. Medicated x1 with hydrocodone for pain with pain relieved. Back brace on. Uses IS as directed. Rested well throughout the night. VSS. LR continues. Safety maintained. Will continue to monitor.

## 2020-11-04 NOTE — PLAN OF CARE
Patient discharged home with family.  RX delivered bedside.  CM scheduled post hospital follow up appointments.  [facility] to contact pt for  services.    Future Appointments   Date Time Provider Department Center   11/17/2020  4:00 PM Alejandro Jon III, MD [facility] to follow for discharge planning needs.  JOSHUA BellN RN  Case Management  [phone]        11/04/20 1540   Final Note   Assessment Type Final Discharge Note   Anticipated Discharge Disposition Home-Health  ([facility] )   Hospital Follow Up  Appt(s) scheduled? Yes   Discharge plans and expectations educations in teach back method with documentation complete? Yes   Post-Acute Status   Post-Acute Authorization Home Health   Home Health Status Set-up Complete   Discharge Delays None known at this time

## 2020-11-06 ENCOUNTER — PATIENT OUTREACH (OUTPATIENT)
Dept: ADMINISTRATIVE | Facility: CLINIC | Age: 70
End: 2020-11-06

## 2020-11-06 NOTE — PROGRESS NOTES
C3 nurse attempted to contact patient. No answer.  C3 nurse attempted to contact Lance Bob for a TCC post hospital discharge follow up call. No answer at phone number listed and no voicemail available. The patient has a HOSFU appointment with Dr Jon on 11/17/20 @ 1600. Message sent to Physician staff.

## 2020-11-09 ENCOUNTER — TELEPHONE (OUTPATIENT)
Dept: NEUROSURGERY | Facility: CLINIC | Age: 70
End: 2020-11-09

## 2020-11-09 NOTE — TELEPHONE ENCOUNTER
----- Message from Ynes Boyd sent at 11/9/2020  1:37 PM CST -----  Contact: Pt @ [phone]  Pt's son is calling about the referral submitted in his chart to see a neurosurgereon. They weren't told to see anyone particular. I'm not able to schedule for neurosurgery to assist them.

## 2020-11-09 NOTE — TELEPHONE ENCOUNTER
RETURNED CALL TO PATIENT NO ANSWER. PATIENT VOICEMAIL WAS NOT SET UP TO LEAVE MESSAGE. PATIENT DOES NOT HAVE AN [facility] PORTAL.

## 2020-11-16 NOTE — PROGRESS NOTES
"Subjective:       Patient ID: Lance Bob is a 70 y.o. male.    Chief Complaint:   Follow-up      HPI    #Last visit/Previous Provider  This patient is new to me  Previously seen by Primary Doctor No  Last visit was > 1 yr        #Interim Hx      Hospital follow up   Admin/Discharge: 11/2-11/4/20  Reason for admission: genevieve matamoros   Brief History:  a tree fell on to his back.;  revealed fracture of T6 and L2 fractures. Neurosurgery was consulted and have no neurosurgical intervention planned at this time.   Pertinent Lab  - Found to have rhabdo, so treated with aggressive hydration, trending CPK  Pertinent Imaging  Discharge Plan;   - home health for PT/OT   -  neurosurgery follow up in 6 weeks. Patient given note for work with lifting restrictions to not be greater than 5 lbs  - started on AMLODIPINE for elevated BP  - pain management Norco 7.5 Q6 PRN    Discharge pertinent meds:     Labs/Imaging pending at the time of discharged:   "For MRI Follow-up is suggested and additional sequences with fat saturation suggested at the time of follow-up."    Since Discharge:   Patient brings paperwork from his office that is been filled out for him to return to work.    #Concerns Today    none    #Chronic Problems    HTN  Complication: no CVA/CKD/CAD  Last labs :   - BMP  Lab Results   Component Value Date     11/04/2020    K 3.7 11/04/2020     11/04/2020    CO2 27 11/04/2020    BUN 6 (L) 11/04/2020    CREATININE 0.7 11/04/2020    CALCIUM 8.4 (L) 11/04/2020    ANIONGAP 9 11/04/2020    ESTGFRAFRICA >60.0 11/04/2020    EGFRNONAA >60.0 11/04/2020     UA, EKG, echo, lipids, hba1c  Medication: adherent to   - amlodipine 5 mg  Home Bps; has bp  cuf  Symptoms: denies CP, SOB, changes in urination, sudden weakness, numbness    Vit D def  - 50K daily    Patient Active Problem List   Diagnosis    Closed fracture of spinous process of thoracic vertebra with routine healing    Left hip pain    Closed compression " "fracture of L3 lumbar vertebra, initial encounter    Vitamin D deficiency    Hypertension           Health Maintenance Due   Topic Date Due    Hepatitis C Screening  1950    Lipid Panel  1950    TETANUS VACCINE  03/24/1967    Shingles Vaccine (1 of 2) 03/24/1999    Colorectal Cancer Screening  03/24/1999    Pneumococcal Vaccine (65+ Low/Medium Risk) (1 of 2 - PCV13) 03/24/2014    Abdominal Aortic Aneurysm Screening  03/24/2014    Influenza Vaccine (1) 08/01/2020     Declined at this time    The patient has no Health Maintenance topics of status Not Due        Review of Systems   Constitutional: Negative for activity change, appetite change, chills, diaphoresis, fatigue, fever and unexpected weight change.   HENT: Negative for congestion, postnasal drip, rhinorrhea and sore throat.    Eyes: Negative for pain, redness and visual disturbance.   Respiratory: Negative for cough, shortness of breath and wheezing.    Cardiovascular: Negative for chest pain and palpitations.   Gastrointestinal: Negative for abdominal distention, abdominal pain, blood in stool, constipation, diarrhea, nausea and vomiting.   Genitourinary: Negative for dysuria.   Neurological: Negative for weakness and numbness.   Psychiatric/Behavioral: Negative for sleep disturbance.       Objective:   /70 (BP Location: Right arm, Patient Position: Sitting, BP Method: Medium (Manual))   Pulse 85   Temp 97.3 °F (36.3 °C) (Temporal)   Ht 5' 2" (1.575 m)   Wt 49 kg (108 lb 0.4 oz)   SpO2 98%   BMI 19.76 kg/m²              Physical Exam  Vitals signs and nursing note reviewed.   Constitutional:       General: He is not in acute distress.     Appearance: He is well-developed. He is not diaphoretic.      Comments: Thin male   HENT:      Head: Normocephalic.      Mouth/Throat:      Pharynx: No oropharyngeal exudate.   Eyes:      General:         Right eye: No discharge.         Left eye: No discharge.      Conjunctiva/sclera: " Conjunctivae normal.      Pupils: Pupils are equal, round, and reactive to light.   Cardiovascular:      Rate and Rhythm: Normal rate and regular rhythm.      Heart sounds: Normal heart sounds. No murmur. No friction rub. No gallop.    Pulmonary:      Effort: Pulmonary effort is normal. No respiratory distress.   Abdominal:      General: There is no distension.      Palpations: Abdomen is soft.   Musculoskeletal:      Comments: No midline spinal tendernes   Skin:     General: Skin is warm.   Neurological:      Mental Status: He is alert.             ASCVD  The ASCVD Risk score (Volcano SALOMON Jr., et al., 2013) failed to calculate for the following reasons:    Cannot find a previous HDL lab    Cannot find a previous total cholesterol lab    Basic Labs  BMP  Lab Results   Component Value Date     11/04/2020    K 3.7 11/04/2020     11/04/2020    CO2 27 11/04/2020    BUN 6 (L) 11/04/2020    CREATININE 0.7 11/04/2020    CALCIUM 8.4 (L) 11/04/2020    ANIONGAP 9 11/04/2020    ESTGFRAFRICA >60.0 11/04/2020    EGFRNONAA >60.0 11/04/2020     Lab Results   Component Value Date     (H) 11/04/2020     (H) 11/04/2020    ALKPHOS 58 11/04/2020    BILITOT 1.2 (H) 11/04/2020       No results found for: TSH        STD  No results found for: HIV1X2, XLK41NPFA  No results found for: RPR  No results found for: HAV, HEPAIGM, HEPBIGM, HEPBCAB, HBEAG, HEPCAB      Lipids  No results found for: CHOL  No results found for: HDL  No results found for: LDLCALC  No results found for: TRIG  No results found for: CHOLHDL    DM  No results found for: LABA1C, HGBA1C    PSA  No results found for: PSA, PSATOTAL, PSAFREE, PSAFREEPCT      MRI Lumbar Spine W WO Cont  Narrative: EXAMINATION:  MRI LUMBAR SPINE W WO CONTRAST    CLINICAL HISTORY:  L/S-spine fracture, pathological; patient post trauma and fall with back injury and L2 left transverse process fracture.    TECHNIQUE:  Multiplanar, multisequence MR images were acquired from the  thoracolumbar junction to the sacrum without and with IV injection of 6 cc of Gadavist contrast.    COMPARISON:  CTA of the lumbar spine 11/01/2020    FINDINGS:  Alignment: There is no subluxation.    Vertebrae: Note is made of a superior endplate compression fracture of the L3 vertebral body more pronounced on the right side extending towards the base of the right pedicle.  There is right-sided mild wedge deformity and cortical irregularity with relative levoscoliosis noted on the prior CT as result.  There is no retropulsion into the spinal canal or significant listhesis.    There is no evidence of fracture extension into the right pedicle or posterior elements of the vertebra L3.    The L2 transverse process fracture as noted by CT is seen to a limited degree due to the lack of fat saturation on the axial series but it is present with expected enhancement of the surrounding soft tissues related to edema/posttraumatic changes.  The presence of a definitive soft tissue enhancing mass to suggest pathologic fracture is not confirmed as imaged.  There is also limited visualization of the transverse process fracture on the left on sagittal imaging with fat saturation limiting characterization.    There are no other marrow signal abnormalities throughout the lumbar vertebrae.    Cord: Normal signal in the visualized distal spinal cord is noted.  The conus terminates at the T12-L1 disc level    Degenerative findings:    T12-L1: No dorsal focal disc abnormality or stenosis.    L1-L2: There is moderate spondylosis with broad-based small dorsal disc protrusion more pronounced in the left foraminal region.  There is no canal stenosis or significant neural foraminal stenosis.    L2-L3: B mild-to-moderate spondylosis with broad-based small dorsal annular bulge/protrusion is noted.  There is no canal or neural foraminal stenosis.  Facet hypertrophic changes are present.    L3-L4: There is minor dorsal annular bulging without  significant disc protrusion or canal or neural foraminal stenosis.  Mild facet hypertrophy bilaterally is noted.    L4-L5: There is broad-based small disc protrusion in the canal which extends to the neural foramina bilaterally.  There is no canal stenosis.  Facet and ligamentous mild bilateral hypertrophy is noted contributing to mild bilateral inferior neural foraminal stenosis.    L5-S1: There is moderate spondylosis and a small broad-based dorsal disc protrusion.  There is no canal stenosis or significant neural foraminal stenosis.  Mild to moderate bilateral facet hypertrophic changes are present.    Sacroiliac joint degenerative changes are seen.    Paraspinous soft tissues and musculature are remarkable for muscular edematous changes in the left pelvic floor musculature which is only seen on a few slices of the sagittal series 7 images 11 through 16.    Multiple cystic foci in the kidneys with dominant lesion on the right noted with several incompletely visualized.  Impression: Superior endplate mild compression fracture of the L3 vertebra with mild wedging to the right.  No evidence of fracture retropulsion, canal hematoma or vertebral body listhesis is seen as result.  There is no visible extension a fracture into the posterior elements.    The left transverse process fracture at L2 is not very well demonstrated with MRI without fat saturation in the axial plane.  There is no significant marrow enhancing lesions to support a pathologic fracture related to a mass/neoplasm with certainty.  The fracture could explained by trauma particularly with an adjacent vertebral body compression.  Follow-up is suggested and additional sequences with fat saturation suggested at the time of follow-up.    No suspicious marrow lesions or enhancing lesions in the vertebral bodies are noted as imaged.    Multilevel spondylosis without canal or significant neural foraminal stenosis.  Detailed findings are described  above.    Paraspinous pelvic floor muscular edematous changes on the left are likely post-traumatic on the left and are partially visualized.    Multiple renal cystic foci with several incompletely imaged.  These findings are better characterized on the abdomen pelvis CT 11/01/2020.    This report was flagged in Epic as abnormal.    Electronically signed by: Nuria Esparza  Date:    11/02/2020  Time:    01:54      Assessment:       1. Closed compression fracture of L3 lumbar vertebra, initial encounter    2. Essential hypertension          New: stable  New: Uncontrolled  New : worsening  New: Improved    Chronic stable  [place] : uncontrolled  Chronic : worsening  Chronic : improved    Plan:             Lance LEE was seen today for follow-up.    Diagnoses and all orders for this visit:    Closed compression fracture of L3 lumbar vertebra, initial encounter  New;uncontrolled  Patient reports onset of back pain after injurry  Unable to complete forms as work requirment are more than NSGY recommendation of no > 5lbs  Will enlist assistance of [facility] and NSGY for other intervention if necessary and rec'd  -     Ambulatory referral/consult to Neurosurgery; Future  -     Ambulatory referral/consult to [facility]; Future    Essential hypertension  New;controlled  -- Patient is at goal today  -- Labs are reviewed and wnl  -- antihypertensive regimen cont as below  --  return for BP follow up 3 month        Future Appointments   Date Time Provider Department Center   12/8/2020  8:40 AM Joaquín Tracy NP [facility]           Medication List with Changes/Refills   Current Medications    AMLODIPINE (NORVASC) 5 MG TABLET    Take 1 tablet (5 mg total) by mouth once daily.    ERGOCALCIFEROL (ERGOCALCIFEROL) 50,000 UNIT CAP    Take 1 capsule (50,000 Units total) by mouth every 7 days.    IBUPROFEN (ADVIL,MOTRIN) 200 MG TABLET    Take 400 mg by mouth every 6 (six) hours as needed (headache).     MULTIVITAMIN (ONE DAILY MULTIVITAMIN) PER TABLET    Take 1 tablet by mouth once daily.   Discontinued Medications    SENNA-DOCUSATE 8.6-50 MG (PERICOLACE) 8.6-50 MG PER TABLET    Take 1 tablet by mouth once daily.         Disclaimer:  This note has been generated using voice-recognition software. There may be grammatical or spelling errors that have been missed during proof-reading

## 2020-11-17 ENCOUNTER — OFFICE VISIT (OUTPATIENT)
Dept: INTERNAL MEDICINE | Facility: CLINIC | Age: 70
End: 2020-11-17
Payer: MEDICARE

## 2020-11-17 VITALS
HEIGHT: 62 IN | BODY MASS INDEX: 19.88 KG/M2 | OXYGEN SATURATION: 98 % | WEIGHT: 108 LBS | DIASTOLIC BLOOD PRESSURE: 70 MMHG | TEMPERATURE: 97 F | SYSTOLIC BLOOD PRESSURE: 118 MMHG | HEART RATE: 85 BPM

## 2020-11-17 DIAGNOSIS — I10 ESSENTIAL HYPERTENSION: ICD-10-CM

## 2020-11-17 DIAGNOSIS — S32.030A CLOSED COMPRESSION FRACTURE OF L3 LUMBAR VERTEBRA, INITIAL ENCOUNTER: Primary | ICD-10-CM

## 2020-11-17 PROCEDURE — 99214 PR OFFICE/OUTPT VISIT, EST, LEVL IV, 30-39 MIN: ICD-10-PCS | Mod: S$PBB,,, | Performed by: INTERNAL MEDICINE

## 2020-11-17 PROCEDURE — 99999 PR PBB SHADOW E&M-EST. PATIENT-LVL IV: ICD-10-PCS | Mod: PBBFAC,,, | Performed by: INTERNAL MEDICINE

## 2020-11-17 PROCEDURE — 99214 OFFICE O/P EST MOD 30 MIN: CPT | Mod: PBBFAC,PO | Performed by: INTERNAL MEDICINE

## 2020-11-17 PROCEDURE — 99214 OFFICE O/P EST MOD 30 MIN: CPT | Mod: S$PBB,,, | Performed by: INTERNAL MEDICINE

## 2020-11-17 PROCEDURE — 99999 PR PBB SHADOW E&M-EST. PATIENT-LVL IV: CPT | Mod: PBBFAC,,, | Performed by: INTERNAL MEDICINE

## 2020-11-18 ENCOUNTER — OFFICE VISIT (OUTPATIENT)
Dept: SPINE | Facility: CLINIC | Age: 70
End: 2020-11-18
Payer: MEDICARE

## 2020-11-18 VITALS
WEIGHT: 108 LBS | HEIGHT: 62 IN | BODY MASS INDEX: 19.88 KG/M2 | SYSTOLIC BLOOD PRESSURE: 137 MMHG | DIASTOLIC BLOOD PRESSURE: 61 MMHG | HEART RATE: 80 BPM

## 2020-11-18 DIAGNOSIS — M54.50 MIDLINE LOW BACK PAIN WITHOUT SCIATICA, UNSPECIFIED CHRONICITY: Primary | ICD-10-CM

## 2020-11-18 DIAGNOSIS — S32.030A CLOSED COMPRESSION FRACTURE OF L3 LUMBAR VERTEBRA, INITIAL ENCOUNTER: ICD-10-CM

## 2020-11-18 PROCEDURE — 99999 PR PBB SHADOW E&M-EST. PATIENT-LVL III: ICD-10-PCS | Mod: PBBFAC,,, | Performed by: NURSE PRACTITIONER

## 2020-11-18 PROCEDURE — 99999 PR PBB SHADOW E&M-EST. PATIENT-LVL III: CPT | Mod: PBBFAC,,, | Performed by: NURSE PRACTITIONER

## 2020-11-18 PROCEDURE — 99213 OFFICE O/P EST LOW 20 MIN: CPT | Mod: PBBFAC | Performed by: NURSE PRACTITIONER

## 2020-11-18 PROCEDURE — 99204 OFFICE O/P NEW MOD 45 MIN: CPT | Mod: S$PBB,,, | Performed by: NURSE PRACTITIONER

## 2020-11-18 PROCEDURE — 99204 PR OFFICE/OUTPT VISIT, NEW, LEVL IV, 45-59 MIN: ICD-10-PCS | Mod: S$PBB,,, | Performed by: NURSE PRACTITIONER

## 2020-11-18 NOTE — PROGRESS NOTES
Subjective:      Patient ID: Lance Bob is a 70 y.o. male.    Chief Complaint: Low-back Pain    Mr Bob is a 69y/o Male presenting with complaint of back pain since 11/1/2020 when he had tree fall on his back. He went to ER and admitted due to Fx workup and Rhabdomyolysis. Pt Fx found to be non-pathologic T6 and L3. Pt was discharged for HH, back brace and to FU with NS. Pt denies any neurological complaints such as bowel or bladder incontinence, denies saddle paresthesias. 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           Review of Systems   Constitution: Negative for fever.   Respiratory: Negative for shortness of breath.    Musculoskeletal: Positive for back pain.   Gastrointestinal: Negative for abdominal pain and bowel incontinence.   Genitourinary: Negative for bladder incontinence.   Neurological: Negative for numbness and paresthesias.   Psychiatric/Behavioral: Negative for altered mental status.           Objective:       Imaging:  MRI LUMBAR SPINE W WO CONTRAST     CLINICAL HISTORY:  L/S-spine fracture, pathological; patient post trauma and fall with back injury and L2 left transverse process fracture.     TECHNIQUE:  Multiplanar, multisequence MR images were acquired from the thoracolumbar junction to the sacrum without and with IV injection of 6 cc of Gadavist contrast.     COMPARISON:  CTA of the lumbar spine 11/01/2020     FINDINGS:  Alignment: There is no subluxation.     Vertebrae: Note is made of a superior endplate compression fracture of the L3 vertebral body more pronounced on the right side extending towards the base of the right pedicle.  There is right-sided mild wedge deformity and cortical irregularity with relative levoscoliosis noted on the prior CT as result.  There is no retropulsion into the spinal canal or significant listhesis.     There is no evidence of fracture extension into the right pedicle or posterior elements of the  vertebra L3.     The L2 transverse process fracture as noted by CT is seen to a limited degree due to the lack of fat saturation on the axial series but it is present with expected enhancement of the surrounding soft tissues related to edema/posttraumatic changes.  The presence of a definitive soft tissue enhancing mass to suggest pathologic fracture is not confirmed as imaged.  There is also limited visualization of the transverse process fracture on the left on sagittal imaging with fat saturation limiting characterization.     There are no other marrow signal abnormalities throughout the lumbar vertebrae.     Cord: Normal signal in the visualized distal spinal cord is noted.  The conus terminates at the T12-L1 disc level     Degenerative findings:     T12-L1: No dorsal focal disc abnormality or stenosis.     L1-L2: There is moderate spondylosis with broad-based small dorsal disc protrusion more pronounced in the left foraminal region.  There is no canal stenosis or significant neural foraminal stenosis.     L2-L3: B mild-to-moderate spondylosis with broad-based small dorsal annular bulge/protrusion is noted.  There is no canal or neural foraminal stenosis.  Facet hypertrophic changes are present.     L3-L4: There is minor dorsal annular bulging without significant disc protrusion or canal or neural foraminal stenosis.  Mild facet hypertrophy bilaterally is noted.     L4-L5: There is broad-based small disc protrusion in the canal which extends to the neural foramina bilaterally.  There is no canal stenosis.  Facet and ligamentous mild bilateral hypertrophy is noted contributing to mild bilateral inferior neural foraminal stenosis.     L5-S1: There is moderate spondylosis and a small broad-based dorsal disc protrusion.  There is no canal stenosis or significant neural foraminal stenosis.  Mild to moderate bilateral facet hypertrophic changes are present.     Sacroiliac joint degenerative changes are  seen.     Paraspinous soft tissues and musculature are remarkable for muscular edematous changes in the left pelvic floor musculature which is only seen on a few slices of the sagittal series 7 images 11 through 16.     Multiple cystic foci in the kidneys with dominant lesion on the right noted with several incompletely visualized.     Impression:     Superior endplate mild compression fracture of the L3 vertebra with mild wedging to the right.  No evidence of fracture retropulsion, canal hematoma or vertebral body listhesis is seen as result.  There is no visible extension a fracture into the posterior elements.     The left transverse process fracture at L2 is not very well demonstrated with MRI without fat saturation in the axial plane.  There is no significant marrow enhancing lesions to support a pathologic fracture related to a mass/neoplasm with certainty.  The fracture could explained by trauma particularly with an adjacent vertebral body compression.  Follow-up is suggested and additional sequences with fat saturation suggested at the time of follow-up.     No suspicious marrow lesions or enhancing lesions in the vertebral bodies are noted as imaged.     Multilevel spondylosis without canal or significant neural foraminal stenosis.  Detailed findings are described above.     Paraspinous pelvic floor muscular edematous changes on the left are likely post-traumatic on the left and are partially visualized.     Multiple renal cystic foci with several incompletely imaged.  These findings are better characterized on the abdomen pelvis CT 11/01/2020.     This report was flagged in Epic as abnormal.    CT CERVICAL SPINE WITHOUT CONTRAST; CT LUMBAR SPINE WITHOUT CONTRAST; CT THORACIC SPINE WITHOUT CONTRAST     CLINICAL HISTORY:  Neck trauma (Age => 64y);; Back pain or radiculopathy, trauma;; Mid-back trauma;     TECHNIQUE:  Multiplanar CT images of the cervical, thoracic, and lumbar spine were performed without the  administration of contrast.     COMPARISON:  CT chest abdomen pelvis 11/01/2020 and CT scan abdomen pelvis dated 05/21/2015.     FINDINGS:  CERVICAL     Alignment: Normal.     Vertebrae: Vertebral body heights are well maintained.  No evidence of fractures.     Skull base and craniocervical junction: Unremarkable     Multilevel cervical spondylolysis with subchondral cystic change, endplate erosion, uncovertebral spurring, facet arthropathy, and posterior osteophyte disc complexes.  Limited evaluation of intraspinal compartment reveals likely moderate canal stenosis at C3-4 and C5-6.  Variable degrees of foraminal narrowing     THORACIC     Normal kyphotic alignment of the thoracic spine.  Mild S-shaped scoliosis of the thoracolumbar spine.     Mild inferior endplate irregularity at T5 through T10, likely Schmorl's nodes.  Vertebral body heights are otherwise well maintained.     Acute nondisplaced fracture of the T6 spinous process (series 602, image 61).     No significant spinal canal stenosis or neural foraminal narrowing.     LUMBAR     Alignment: Normal.     Vertebral body heights are well maintained.  There is desiccation of the discs at L4-5 and L5-S1 consistent with degenerative change.  There is a lytic lesion centered in the left transverse process at L2.  There is suspected pathological fracture of the left L2 transverse process.     Degenerative findings:     Mild multilevel lumbar spondylosis with mild facet arthropathy, anterior osteophytes, and posterior disc bulges.  No significant spinal canal stenosis.  Variable neural foraminal narrowing     Paraspinal muscles & soft tissues: See same day chest abdomen pelvis report for detailed organ evaluation of the thorax and abdominopelvic compartments.     Impression:     Acute nondisplaced fracture of the T6 spinous process.     Lytic lesion centered in the left L2 transverse process with suspected pathological fracture of the left transverse process at  L2.  MRI of the lumbar spine without and with contrast is suggested for further evaluation.     Multilevel cervical spondylolysis with likely moderate canal stenosis at C3-4 and C5-6.     Mild multilevel lumbar spondylosis with no significant spinal canal stenosis.     This report was flagged in Epic as abnormal.    Past Medical History:   Diagnosis Date    Closed compression fracture of L3 lumbar vertebra, initial encounter 11/03/2020    due to tree falling on back    Vitamin D deficiency 11/3/2020       Past Surgical History:   Procedure Laterality Date    HERNIA REPAIR         History reviewed. No pertinent family history.    Social History     Socioeconomic History    Marital status:      Spouse name: Not on file    Number of children: Not on file    Years of education: Not on file    Highest education level: Not on file   Occupational History    Not on file   Social Needs    Financial resource strain: Not on file    Food insecurity     Worry: Not on file     Inability: Not on file    Transportation needs     Medical: Not on file     Non-medical: Not on file   Tobacco Use    Smoking status: Current Every Day Smoker     Packs/day: 0.50    Smokeless tobacco: Never Used    Tobacco comment: started. 2005 used to quit for years    Substance and Sexual Activity    Alcohol use: No    Drug use: Not Currently    Sexual activity: Not Currently   Lifestyle    Physical activity     Days per week: Not on file     Minutes per session: Not on file    Stress: Not on file   Relationships    Social connections     Talks on phone: Not on file     Gets together: Not on file     Attends Advent service: Not on file     Active member of club or organization: Not on file     Attends meetings of clubs or organizations: Not on file     Relationship status: Not on file   Other Topics Concern    Not on file   Social History Narrative    Patient is originally from [facility] [place] [facility]         College/university ; high school         Working ; not working            Hannah choudhary wife             Children        Lives with         Diet/Exericse           Current Outpatient Medications   Medication Sig Dispense Refill    amLODIPine (NORVASC) 5 MG tablet Take 1 tablet (5 mg total) by mouth once daily. 30 tablet 11    ergocalciferol (ERGOCALCIFEROL) 50,000 unit Cap Take 1 capsule (50,000 Units total) by mouth every 7 days. 4 capsule 2    ibuprofen (ADVIL,MOTRIN) 200 MG tablet Take 400 mg by mouth every 6 (six) hours as needed (headache).      multivitamin (ONE DAILY MULTIVITAMIN) per tablet Take 1 tablet by mouth once daily.       No current facility-administered medications for this visit.        Review of patient's allergies indicates:  No Known Allergies         Past Medical History:   Diagnosis Date    Closed compression fracture of L3 lumbar vertebra, initial encounter 11/03/2020    due to tree falling on back    Vitamin D deficiency 11/3/2020       Past Surgical History:   Procedure Laterality Date    HERNIA REPAIR         History reviewed. No pertinent family history.    Social History     Socioeconomic History    Marital status:      Spouse name: Not on file    Number of children: Not on file    Years of education: Not on file    Highest education level: Not on file   Occupational History    Not on file   Social Needs    Financial resource strain: Not on file    Food insecurity     Worry: Not on file     Inability: Not on file    Transportation needs     Medical: Not on file     Non-medical: Not on file   Tobacco Use    Smoking status: Current Every Day Smoker     Packs/day: 0.50    Smokeless tobacco: Never Used    Tobacco comment: started. 2005 used to quit for years    Substance and Sexual Activity    Alcohol use: No    Drug use: Not Currently    Sexual activity: Not Currently   Lifestyle    Physical activity     Days per week: Not on file     Minutes per  session: Not on file    Stress: Not on file   Relationships    Social connections     Talks on phone: Not on file     Gets together: Not on file     Attends Gnosticist service: Not on file     Active member of club or organization: Not on file     Attends meetings of clubs or organizations: Not on file     Relationship status: Not on file   Other Topics Concern    Not on file   Social History Narrative    Patient is originally from [facility] ; high school         Working ; not working            Hannah choudhary wife             Children        Lives with         Diet/Exericse           Current Outpatient Medications   Medication Sig Dispense Refill    amLODIPine (NORVASC) 5 MG tablet Take 1 tablet (5 mg total) by mouth once daily. 30 tablet 11    ergocalciferol (ERGOCALCIFEROL) 50,000 unit Cap Take 1 capsule (50,000 Units total) by mouth every 7 days. 4 capsule 2    ibuprofen (ADVIL,MOTRIN) 200 MG tablet Take 400 mg by mouth every 6 (six) hours as needed (headache).      multivitamin (ONE DAILY MULTIVITAMIN) per tablet Take 1 tablet by mouth once daily.       No current facility-administered medications for this visit.        Review of patient's allergies indicates:  No Known Allergies    General: Lance LEE is well-developed, well-nourished, appears stated age, in no acute distress, alert and oriented to time, place and person.     General    Nursing note and vitals reviewed.  Constitutional: He is oriented to person, place, and time. He appears well-developed and well-nourished.   Eyes: Conjunctivae are normal.   Cardiovascular: Normal rate.    Pulmonary/Chest: Effort normal.   Neurological: He is alert and oriented to person, place, and time.   Psychiatric: He has a normal mood and affect. His behavior is normal.         Back (L-Spine & T-Spine) / Neck (C-Spine) Exam     Comments:  Wearing back brace and using walker at this time  There is no midline  TTP, +facet loading to lower lumbar   (-) slump bilaterally  No change in sensation to L2-S1 Dermatome       Muscle Strength   Right Lower Extremity   Hip Flexion: 4/5   Quadriceps:  4/5   Hamstrin/5   Anterior tibial:  4/5   Gastrocsoleus:  4/5   EHL:  4/5  Left Lower Extremity   Hip Flexion: 4/5   Quadriceps:  4/5   Hamstrin/5   Anterior tibial:  4/5   Gastrocsoleus:  4/5   EHL:  4/5    Reflexes     Left Side  Babinski Sign:  absent  Ankle Clonus:  absent    Right Side   Babinski Sign:  absent  Ankle Clonus:  absent              Assessment:       1. Closed compression fracture of L3 lumbar vertebra, initial encounter           Plan:          - Prior records reviewed  - Post Trauma with T6 and L2 Fx. There is no midline TTP   - He states mild pain, no midline TTP,  and his focus today was to get clearance to return to work.  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   - Discussed with supervisor, Pt and wife I did not feel comfortable clearing him at this time just 2.5weeks out of trauma  - Discussed continued back brace use, ordered PT as he has not started HH PT   - Will have him RTC in 2 weeks for re-evaluation.     Follow-up: No follow-ups on file. If there are any questions prior to this, the patient was instructed to contact the office.

## 2020-11-18 NOTE — LETTER
November 20, 2020      Alejandro Jon III, MD  [address]  [place] [address]           [facility] [address]  [address], [address]  [facility] [address]  Phone: [phone]  Fax: [phone]          Patient: Lance Bob   MR Number: [id number]   YOB: 1950   Date of Visit: 11/18/2020       Dear Dr. Alejandro Jon III:    Thank you for referring Lance Bob to me for evaluation. Attached you will find relevant portions of my assessment and plan of care.    If you have questions, please do not hesitate to call me. I look forward to following Lance Bob along with you.    Sincerely,    Joaquín Tracy, NP    Enclosure  CC:  No Recipients    If you would like to receive this communication electronically, please contact [email or web address] or [phone] to request more information on [employer] access.    For providers and/or their staff who would like to refer a patient to [facility], please contact us through our one-stop-shop provider referral line, [facility], at [phone].    If you feel you have received this communication in error or would no longer like to receive these types of communications, please e-mail [email or web address]

## 2020-11-19 ENCOUNTER — TELEPHONE (OUTPATIENT)
Dept: PAIN MEDICINE | Facility: CLINIC | Age: 70
End: 2020-11-19

## 2020-11-19 ENCOUNTER — TELEPHONE (OUTPATIENT)
Dept: ADMINISTRATIVE | Facility: OTHER | Age: 70
End: 2020-11-19

## 2020-11-19 NOTE — TELEPHONE ENCOUNTER
----- Message from Ganesh Greene sent at 11/19/2020  2:58 PM CST -----  Name of Who is Calling: Anabella (home health) What is the request in detail:Anabella is calling to speak to the nurse in regards to finding out if the doctor ordered out patient or home therapy for the patient. Please advise Can the clinic reply by [facility]: No What Number to Call Back if not in [facility]: [phone]

## 2020-11-19 NOTE — TELEPHONE ENCOUNTER
Staff contacted anabella [facility] nurse regarding her message.    Staff informed Anabella therapy orders were place for out patient rehab.    She stated she will inform patient he should receive a call to setup appointment

## 2020-11-20 DIAGNOSIS — Z12.11 COLON CANCER SCREENING: ICD-10-CM

## 2020-11-30 ENCOUNTER — CLINICAL SUPPORT (OUTPATIENT)
Dept: REHABILITATION | Facility: HOSPITAL | Age: 70
End: 2020-11-30
Payer: MEDICARE

## 2020-11-30 DIAGNOSIS — R29.898 IMPAIRED FLEXIBILITY OF LOWER EXTREMITY: ICD-10-CM

## 2020-11-30 DIAGNOSIS — S32.030A CLOSED COMPRESSION FRACTURE OF L3 LUMBAR VERTEBRA, INITIAL ENCOUNTER: ICD-10-CM

## 2020-11-30 DIAGNOSIS — M54.50 MIDLINE LOW BACK PAIN WITHOUT SCIATICA, UNSPECIFIED CHRONICITY: ICD-10-CM

## 2020-11-30 PROCEDURE — 97110 THERAPEUTIC EXERCISES: CPT | Mod: PN | Performed by: PHYSICAL THERAPIST

## 2020-11-30 PROCEDURE — 97162 PT EVAL MOD COMPLEX 30 MIN: CPT | Mod: PN | Performed by: PHYSICAL THERAPIST
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
## 2020-12-01 ENCOUNTER — TELEPHONE (OUTPATIENT)
Dept: NEUROSURGERY | Facility: CLINIC | Age: 70
End: 2020-12-01

## 2020-12-01 DIAGNOSIS — S32.030A COMPRESSION FRACTURE OF L3 VERTEBRA, INITIAL ENCOUNTER: Primary | ICD-10-CM

## 2020-12-03 ENCOUNTER — CLINICAL SUPPORT (OUTPATIENT)
Dept: REHABILITATION | Facility: HOSPITAL | Age: 70
End: 2020-12-03
Payer: MEDICARE

## 2020-12-03 DIAGNOSIS — R29.898 IMPAIRED FLEXIBILITY OF LOWER EXTREMITY: ICD-10-CM

## 2020-12-03 PROCEDURE — 97110 THERAPEUTIC EXERCISES: CPT | Mod: PN

## 2020-12-03 NOTE — PROGRESS NOTES
"  Physical Therapy Daily Treatment Note     Name: Lance Bob  Clinic Number: [id number]    Therapy Diagnosis:   Encounter Diagnosis   Name Primary?    Impaired flexibility of lower extremity      Physician: Joaquín Tracy NP    Visit Date: 12/3/2020  Physician Orders: PT Eval and Treat   Medical Diagnosis: S32.030A (ICD-10-CM) - Closed compression fracture of L3 lumbar vertebra, initial encounter  M54.5 (ICD-10-CM) - Midline low back pain without sciatica, unspecified chronicity  Evaluation Date: 11/30/2020  Plan of Care Certification Period: 12/31/20  Authorization Dates: 11/18/20 to 12/31/20   Visit # / Visits authorized: 2/50  PTA: 0/6  FOTO: 1/5      Time In: 10:00 am  Time Out: 10:45am  Total Billable Time: 45 minutes    Precautions: Standard and no bending lifting twisting    Subjective     Pt reports: That he is pain free and that the stretches were helpful to relieve soreness.  He was compliant with home exercise program.  Response to previous treatment: reduced stiffness  Functional change: improved mobility     Pain: 0/10  Location: bilateral back      Objective     Lance received therapeutic exercises to develop strength, endurance, ROM, flexibility, posture and core stabilization for 10 minutes including:     Pelvic tilts - 20x3"  B gastroc stretch - 3x20"  B piriformis stretch - 3x20"   B hamstring stretch standing - 3x20"   Hip flexor stretch off of mat -   B ITB stretch with strap 3x 20''  Clams - x20   Mini squats 3x10                Lance received the following manual therapy techniques: Joint mobilizations, Myofacial release and Soft tissue Mobilization were applied to the: cervioscapular area for 0 minutes:  Not performed today     Home Exercises and Patient Education Provided     Education provided re:   - progress towards goals   - role of therapy in multi - disciplinary team, goals for therapy  - How to contact PT  - Anatomy and diagnosis  - Importance of wearing LSO and following " precautions  No spiritual or educational barriers to learning provided     Written Home Exercises Provided: Yes.  Exercises were reviewed and Lance was able to demonstrate them prior to the end of the session.   Pt received a written copy of exercises to perform at home. Lance demonstrated good  understanding of the education provided.      HEP can be found under patient instructions in Media from 11/30/20.    Assessment   Lance appears to be recovering from his injury well. He is responding well to the current plan of improving flexibility. Added hip flexor stretch today as well as ITB stretch. We also initiated hip and overall LE strengthening with mini squats and clams. Updated HEP adding all new exercises (not ITB stretch).     Lance is progressing well towards his goals.   Pt prognosis is Good.     Pt will continue to benefit from skilled outpatient physical therapy to address the deficits listed in the problem list box on initial evaluation, provide pt/family education and to maximize pt's level of independence in the home and community environment.     Pt's spiritual, cultural and educational needs considered and pt agreeable to plan of care and goals.    Anticipated barriers to physical therapy: advanced age    Goals:  Short Term Goals: 3 weeks:  1. Patient will demonstrate 8 degree improvement in bilateral hip external rotation PROM for tying of his shoes.  2. Patient will demonstrate an understanding of his precautions and be able to repeat them.  3. Patient will demonstrate > 10 degrees of improvement on bilateral hamstring 90/90 test to help improve gait and stance.     Long Term Goals: 8 weeks:  1. 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  2. Patient will report a little bit of difficulty with moderate activities, like moving a table, pushing a  vacuum .    Plan   Certification Period: 11/30/2020  to 12/31/20.    Cont to progress flexibility and slowly build LE and core strength    Logan HARITHA Garcia

## 2020-12-08 ENCOUNTER — CLINICAL SUPPORT (OUTPATIENT)
Dept: REHABILITATION | Facility: HOSPITAL | Age: 70
End: 2020-12-08
Payer: MEDICARE

## 2020-12-08 ENCOUNTER — OFFICE VISIT (OUTPATIENT)
Dept: SPINE | Facility: CLINIC | Age: 70
End: 2020-12-08
Payer: MEDICARE

## 2020-12-08 VITALS
BODY MASS INDEX: 19.88 KG/M2 | SYSTOLIC BLOOD PRESSURE: 125 MMHG | HEIGHT: 62 IN | WEIGHT: 108 LBS | HEART RATE: 72 BPM | DIASTOLIC BLOOD PRESSURE: 67 MMHG

## 2020-12-08 DIAGNOSIS — S32.030A CLOSED COMPRESSION FRACTURE OF L3 LUMBAR VERTEBRA, INITIAL ENCOUNTER: Primary | ICD-10-CM

## 2020-12-08 DIAGNOSIS — R29.898 IMPAIRED FLEXIBILITY OF LOWER EXTREMITY: ICD-10-CM

## 2020-12-08 DIAGNOSIS — M54.50 MIDLINE LOW BACK PAIN WITHOUT SCIATICA, UNSPECIFIED CHRONICITY: ICD-10-CM

## 2020-12-08 PROCEDURE — 97110 THERAPEUTIC EXERCISES: CPT | Mod: PN,CQ

## 2020-12-08 PROCEDURE — 99213 PR OFFICE/OUTPT VISIT, EST, LEVL III, 20-29 MIN: ICD-10-PCS | Mod: S$PBB,,, | Performed by: NURSE PRACTITIONER

## 2020-12-08 PROCEDURE — 99213 OFFICE O/P EST LOW 20 MIN: CPT | Mod: PBBFAC | Performed by: NURSE PRACTITIONER

## 2020-12-08 PROCEDURE — 99999 PR PBB SHADOW E&M-EST. PATIENT-LVL III: CPT | Mod: PBBFAC,,, | Performed by: NURSE PRACTITIONER

## 2020-12-08 PROCEDURE — 99999 PR PBB SHADOW E&M-EST. PATIENT-LVL III: ICD-10-PCS | Mod: PBBFAC,,, | Performed by: NURSE PRACTITIONER

## 2020-12-08 PROCEDURE — 99213 OFFICE O/P EST LOW 20 MIN: CPT | Mod: S$PBB,,, | Performed by: NURSE PRACTITIONER

## 2020-12-08 NOTE — PROGRESS NOTES
"  Physical Therapy Daily Treatment Note     Name: Lance Bob  Clinic Number: [id number]    Therapy Diagnosis:   Encounter Diagnosis   Name Primary?    Impaired flexibility of lower extremity      Physician: Joaquín Tracy NP    Visit Date: 12/8/2020  Physician Orders: PT Eval and Treat   Medical Diagnosis: S32.030A (ICD-10-CM) - Closed compression fracture of L3 lumbar vertebra, initial encounter  M54.5 (ICD-10-CM) - Midline low back pain without sciatica, unspecified chronicity  Evaluation Date: 11/30/2020  Plan of Care Certification Period: 12/31/20  Authorization Dates: 11/18/20 to 12/31/20   Visit # / Visits authorized: 3/50  PTA: 1/6  FOTO: 3/5    Time In: 1:35 pm  Time Out: 2:20 pm  Total Billable Time: 45 minutes 3TE    Precautions: Standard and no bending lifting twisting    Subjective     Pt reports: Agreeable to PT session. Minor reports of low back pain   He was compliant with home exercise program.  Response to previous treatment: reduced stiffness  Functional change: improved mobility     Pain: 3/10  Location: bilateral back      Objective     Lance received therapeutic exercises to develop strength, endurance, ROM, flexibility, posture and core stabilization for 10 minutes including:     Pelvic tilts - 20x3"  B gastroc stretch - 3x20"  B piriformis stretch - 3x20"   B hamstring stretch standing - 3x20"   Hip flexor stretch off of mat -   B ITB stretch with strap 3x 20''  Clams - x20   Mini squats 3x10   Step ups x15 B on 6"step in // bars  Toe taps (3 cone) 5x2  Soccer ball rolls FWD/ BWD 2 x 10 B with 1 UE assist (touch bar)    Ambulated indoors on level surface 150 ft with no assistive device and SBA only.     Lance received the following manual therapy techniques: Joint mobilizations, Myofacial release and Soft tissue Mobilization were applied to the: cervioscapular area for 0 minutes:  Not performed today     Home Exercises and Patient Education Provided     Education provided re:   - " cont HEP  - spoke with pt's wife about ambulating indoors with RW within safety,    Advised pt not to ascend / descend steps at home without assistance (they have no hand rails for support).   - Importance of wearing LSO and following precautions  No spiritual or educational barriers to learning provided     Written Home Exercises Provided: Yes.  Exercises were reviewed and Lance was able to demonstrate them prior to the end of the session.   Pt received a written copy of exercises to perform at home. Lance demonstrated good  understanding of the education provided.      HEP can be found under patient instructions in Media from 11/30/20.    Assessment   Pt completed session with no reports of increased pain in lumbar. Pt able to recall precautions and proper use of lumbar support brace. He ambulated with no episodes of loss of balance without RW in gym on level surfaces. Pt presents forward trunk / neck with ambulation but appropriate reciprocal UE swing.     Lance is progressing well towards his goals.   Pt prognosis is Good.     Pt will continue to benefit from skilled outpatient physical therapy to address the deficits listed in the problem list box on initial evaluation, provide pt/family education and to maximize pt's level of independence in the home and community environment.     Pt's spiritual, cultural and educational needs considered and pt agreeable to plan of care and goals.    Anticipated barriers to physical therapy: advanced age    Goals:  Short Term Goals: 3 weeks:  1. Patient will demonstrate 8 degree improvement in bilateral hip external rotation PROM for tying of his shoes. (ongoing, not met)  2. Patient will demonstrate an understanding of his precautions and be able to repeat them. (ongoing, not met)  3. Patient will demonstrate > 10 degrees of improvement on bilateral hamstring 90/90 test to help improve gait and stance. (ongoing, not met)     Long Term Goals: 8 weeks:  1. 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 (ongoing, not met)  2. Patient will report a little bit of difficulty with moderate activities, like moving a table, pushing a vacuum . (ongoing, not met)    Plan   Certification Period: 11/30/2020 to 12/31/20.    Cont to progress flexibility   Issac Squires, PTA

## 2020-12-08 NOTE — PROGRESS NOTES
Subjective:      Patient ID: Lance Bob is a 70 y.o. male.    Chief Complaint: Follow-up    Interval History 12/8/2020:  The patient presents for follow up of lower back pain. He is overall doing well. He is very pleased with progress of Physical Therapy. He continues to use back brace and using walker less frequently. He states last night was 1st night he was able to sleep on stomach without pain. He is eager to return to work. The patient denies myelopathic symptoms such as handwriting changes or difficulty with buttons/coins/keys. Denies perineal paresthesias, bowel/bladder dysfunction.      HPI:  Mr Bob is a 71y/o Male presenting with complaint of back pain since 11/1/2020 when he had tree fall on his back. He went to ER and admitted due to Fx workup and Rhabdomyolysis. Pt Fx found to be non-pathologic T6 and L3. Pt was discharged for HH, back brace and to FU with NS. Pt denies any neurological complaints such as bowel or bladder incontinence, denies saddle paresthesias. 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     Follow-up  Pertinent negatives include no abdominal pain, fever or numbness.       Review of Systems   Constitution: Negative for fever.   Respiratory: Negative for shortness of breath.    Musculoskeletal: Positive for back pain.   Gastrointestinal: Negative for abdominal pain and bowel incontinence.   Genitourinary: Negative for bladder incontinence.   Neurological: Negative for numbness and paresthesias.   Psychiatric/Behavioral: Negative for altered mental status.           Objective:       Imaging:  MRI LUMBAR SPINE W WO CONTRAST     CLINICAL HISTORY:  L/S-spine fracture, pathological; patient post trauma and fall with back injury and L2 left transverse process fracture.     TECHNIQUE:  Multiplanar, multisequence MR images were acquired from the thoracolumbar junction to the sacrum without and with IV injection of 6 cc of Gadavist  contrast.     COMPARISON:  CTA of the lumbar spine 11/01/2020     FINDINGS:  Alignment: There is no subluxation.     Vertebrae: Note is made of a superior endplate compression fracture of the L3 vertebral body more pronounced on the right side extending towards the base of the right pedicle.  There is right-sided mild wedge deformity and cortical irregularity with relative levoscoliosis noted on the prior CT as result.  There is no retropulsion into the spinal canal or significant listhesis.     There is no evidence of fracture extension into the right pedicle or posterior elements of the vertebra L3.     The L2 transverse process fracture as noted by CT is seen to a limited degree due to the lack of fat saturation on the axial series but it is present with expected enhancement of the surrounding soft tissues related to edema/posttraumatic changes.  The presence of a definitive soft tissue enhancing mass to suggest pathologic fracture is not confirmed as imaged.  There is also limited visualization of the transverse process fracture on the left on sagittal imaging with fat saturation limiting characterization.     There are no other marrow signal abnormalities throughout the lumbar vertebrae.     Cord: Normal signal in the visualized distal spinal cord is noted.  The conus terminates at the T12-L1 disc level     Degenerative findings:     T12-L1: No dorsal focal disc abnormality or stenosis.     L1-L2: There is moderate spondylosis with broad-based small dorsal disc protrusion more pronounced in the left foraminal region.  There is no canal stenosis or significant neural foraminal stenosis.     L2-L3: B mild-to-moderate spondylosis with broad-based small dorsal annular bulge/protrusion is noted.  There is no canal or neural foraminal stenosis.  Facet hypertrophic changes are present.     L3-L4: There is minor dorsal annular bulging without significant disc protrusion or canal or neural foraminal stenosis.  Mild  facet hypertrophy bilaterally is noted.     L4-L5: There is broad-based small disc protrusion in the canal which extends to the neural foramina bilaterally.  There is no canal stenosis.  Facet and ligamentous mild bilateral hypertrophy is noted contributing to mild bilateral inferior neural foraminal stenosis.     L5-S1: There is moderate spondylosis and a small broad-based dorsal disc protrusion.  There is no canal stenosis or significant neural foraminal stenosis.  Mild to moderate bilateral facet hypertrophic changes are present.     Sacroiliac joint degenerative changes are seen.     Paraspinous soft tissues and musculature are remarkable for muscular edematous changes in the left pelvic floor musculature which is only seen on a few slices of the sagittal series 7 images 11 through 16.     Multiple cystic foci in the kidneys with dominant lesion on the right noted with several incompletely visualized.     Impression:     Superior endplate mild compression fracture of the L3 vertebra with mild wedging to the right.  No evidence of fracture retropulsion, canal hematoma or vertebral body listhesis is seen as result.  There is no visible extension a fracture into the posterior elements.     The left transverse process fracture at L2 is not very well demonstrated with MRI without fat saturation in the axial plane.  There is no significant marrow enhancing lesions to support a pathologic fracture related to a mass/neoplasm with certainty.  The fracture could explained by trauma particularly with an adjacent vertebral body compression.  Follow-up is suggested and additional sequences with fat saturation suggested at the time of follow-up.     No suspicious marrow lesions or enhancing lesions in the vertebral bodies are noted as imaged.     Multilevel spondylosis without canal or significant neural foraminal stenosis.  Detailed findings are described above.     Paraspinous pelvic floor muscular edematous changes on the  left are likely post-traumatic on the left and are partially visualized.     Multiple renal cystic foci with several incompletely imaged.  These findings are better characterized on the abdomen pelvis CT 11/01/2020.     This report was flagged in Epic as abnormal.    CT CERVICAL SPINE WITHOUT CONTRAST; CT LUMBAR SPINE WITHOUT CONTRAST; CT THORACIC SPINE WITHOUT CONTRAST     CLINICAL HISTORY:  Neck trauma (Age => 64y);; Back pain or radiculopathy, trauma;; Mid-back trauma;     TECHNIQUE:  Multiplanar CT images of the cervical, thoracic, and lumbar spine were performed without the administration of contrast.     COMPARISON:  CT chest abdomen pelvis 11/01/2020 and CT scan abdomen pelvis dated 05/21/2015.     FINDINGS:  CERVICAL     Alignment: Normal.     Vertebrae: Vertebral body heights are well maintained.  No evidence of fractures.     Skull base and craniocervical junction: Unremarkable     Multilevel cervical spondylolysis with subchondral cystic change, endplate erosion, uncovertebral spurring, facet arthropathy, and posterior osteophyte disc complexes.  Limited evaluation of intraspinal compartment reveals likely moderate canal stenosis at C3-4 and C5-6.  Variable degrees of foraminal narrowing     THORACIC     Normal kyphotic alignment of the thoracic spine.  Mild S-shaped scoliosis of the thoracolumbar spine.     Mild inferior endplate irregularity at T5 through T10, likely Schmorl's nodes.  Vertebral body heights are otherwise well maintained.     Acute nondisplaced fracture of the T6 spinous process (series 602, image 61).     No significant spinal canal stenosis or neural foraminal narrowing.     LUMBAR     Alignment: Normal.     Vertebral body heights are well maintained.  There is desiccation of the discs at L4-5 and L5-S1 consistent with degenerative change.  There is a lytic lesion centered in the left transverse process at L2.  There is suspected pathological fracture of the left L2 transverse  process.     Degenerative findings:     Mild multilevel lumbar spondylosis with mild facet arthropathy, anterior osteophytes, and posterior disc bulges.  No significant spinal canal stenosis.  Variable neural foraminal narrowing     Paraspinal muscles & soft tissues: See same day chest abdomen pelvis report for detailed organ evaluation of the thorax and abdominopelvic compartments.     Impression:     Acute nondisplaced fracture of the T6 spinous process.     Lytic lesion centered in the left L2 transverse process with suspected pathological fracture of the left transverse process at L2.  MRI of the lumbar spine without and with contrast is suggested for further evaluation.     Multilevel cervical spondylolysis with likely moderate canal stenosis at C3-4 and C5-6.     Mild multilevel lumbar spondylosis with no significant spinal canal stenosis.     This report was flagged in Epic as abnormal.    Past Medical History:   Diagnosis Date    Closed compression fracture of L3 lumbar vertebra, initial encounter 11/03/2020    due to tree falling on back    Vitamin D deficiency 11/3/2020       Past Surgical History:   Procedure Laterality Date    HERNIA REPAIR         History reviewed. No pertinent family history.    Social History     Socioeconomic History    Marital status:      Spouse name: Not on file    Number of children: Not on file    Years of education: Not on file    Highest education level: Not on file   Occupational History    Not on file   Social Needs    Financial resource strain: Not on file    Food insecurity     Worry: Not on file     Inability: Not on file    Transportation needs     Medical: Not on file     Non-medical: Not on file   Tobacco Use    Smoking status: Current Every Day Smoker     Packs/day: 0.50    Smokeless tobacco: Never Used    Tobacco comment: started. 2005 used to quit for years    Substance and Sexual Activity    Alcohol use: No    Drug use: Not Currently     Sexual activity: Not Currently   Lifestyle    Physical activity     Days per week: Not on file     Minutes per session: Not on file    Stress: Not on file   Relationships    Social connections     Talks on phone: Not on file     Gets together: Not on file     Attends Church service: Not on file     Active member of club or organization: Not on file     Attends meetings of clubs or organizations: Not on file     Relationship status: Not on file   Other Topics Concern    Not on file   Social History Narrative    Patient is originally from [place]        School at [employer] ; high school         Working ; not working            Hannah choudhary wife             Children        Lives with         Diet/Exericse           Current Outpatient Medications   Medication Sig Dispense Refill    amLODIPine (NORVASC) 5 MG tablet Take 1 tablet (5 mg total) by mouth once daily. 30 tablet 11    ergocalciferol (ERGOCALCIFEROL) 50,000 unit Cap Take 1 capsule (50,000 Units total) by mouth every 7 days. 4 capsule 2    ibuprofen (ADVIL,MOTRIN) 200 MG tablet Take 400 mg by mouth every 6 (six) hours as needed (headache).      multivitamin (ONE DAILY MULTIVITAMIN) per tablet Take 1 tablet by mouth once daily.       No current facility-administered medications for this visit.        Review of patient's allergies indicates:  No Known Allergies         Past Medical History:   Diagnosis Date    Closed compression fracture of L3 lumbar vertebra, initial encounter 11/03/2020    due to tree falling on back    Vitamin D deficiency 11/3/2020       Past Surgical History:   Procedure Laterality Date    HERNIA REPAIR         History reviewed. No pertinent family history.    Social History     Socioeconomic History    Marital status:      Spouse name: Not on file    Number of children: Not on file    Years of education: Not on file    Highest education level: Not on file   Occupational History     Not on file   Social Needs    Financial resource strain: Not on file    Food insecurity     Worry: Not on file     Inability: Not on file    Transportation needs     Medical: Not on file     Non-medical: Not on file   Tobacco Use    Smoking status: Current Every Day Smoker     Packs/day: 0.50    Smokeless tobacco: Never Used    Tobacco comment: started. 2005 used to quit for years    Substance and Sexual Activity    Alcohol use: No    Drug use: Not Currently    Sexual activity: Not Currently   Lifestyle    Physical activity     Days per week: Not on file     Minutes per session: Not on file    Stress: Not on file   Relationships    Social connections     Talks on phone: Not on file     Gets together: Not on file     Attends [employer] service: Not on file     Active member of club or organization: Not on file     Attends meetings of clubs or organizations: Not on file     Relationship status: Not on file   Other Topics Concern    Not on file   Social History Narrative    Patient is originally from [facility]        School at [email or web address] ; high school         Working ; not working            Hannah choudhary wife             Children        Lives with         Diet/Exericse           Current Outpatient Medications   Medication Sig Dispense Refill    amLODIPine (NORVASC) 5 MG tablet Take 1 tablet (5 mg total) by mouth once daily. 30 tablet 11    ergocalciferol (ERGOCALCIFEROL) 50,000 unit Cap Take 1 capsule (50,000 Units total) by mouth every 7 days. 4 capsule 2    ibuprofen (ADVIL,MOTRIN) 200 MG tablet Take 400 mg by mouth every 6 (six) hours as needed (headache).      multivitamin (ONE DAILY MULTIVITAMIN) per tablet Take 1 tablet by mouth once daily.       No current facility-administered medications for this visit.        Review of patient's allergies indicates:  No Known Allergies    General: Lance LEE is well-developed, well-nourished, appears stated age, in no acute  distress, alert and oriented to time, place and person.     General    Nursing note and vitals reviewed.  Constitutional: He is oriented to person, place, and time. He appears well-developed and well-nourished.   Eyes: Conjunctivae are normal.   Cardiovascular: Normal rate.    Pulmonary/Chest: Effort normal.   Neurological: He is alert and oriented to person, place, and time.   Psychiatric: He has a normal mood and affect. His behavior is normal.         Back (L-Spine & T-Spine) / Neck (C-Spine) Exam     Comments:  Wearing back brace and using walker at this time  There is no midline TTP, +facet loading to lower lumbar   (-) slump bilaterally  No change in sensation to L2-S1 Dermatome   Using rolling walker but able to ambulate without easily.       Muscle Strength   Right Lower Extremity   Hip Flexion: 5/5   Quadriceps:  5/5   Hamstrin/5   Anterior tibial:  5/5   Gastrocsoleus:  5/5   EHL:  4/5  Left Lower Extremity   Hip Flexion: 5/5   Quadriceps:  5/5   Hamstrin/5   Anterior tibial:  5/5   Gastrocsoleus:  5/5   EHL:  4/5    Reflexes     Left Side  Achilles:  2+  Babinski Sign:  absent  Ankle Clonus:  absent  Quadriceps:  2+    Right Side   Achilles:  2+  Babinski Sign:  absent  Ankle Clonus:  absent  Quadriceps:  3+              Assessment:       No diagnosis found.       Plan:          - Prior records reviewed  - He has shown significant improvement in strength and voices how he feels much improved  - Advised him to continue PT as there has been significant benefit  - He will follow up tomorrow with NS  - Will have him RTC in 2 weeks for re-evaluation.     Follow-up: No follow-ups on file. If there are any questions prior to this, the patient was instructed to contact the office.

## 2020-12-09 ENCOUNTER — HOSPITAL ENCOUNTER (OUTPATIENT)
Dept: RADIOLOGY | Facility: HOSPITAL | Age: 70
Discharge: HOME | End: 2020-12-09
Attending: PHYSICIAN ASSISTANT
Payer: MEDICARE

## 2020-12-09 ENCOUNTER — OFFICE VISIT (OUTPATIENT)
Dept: NEUROSURGERY | Facility: CLINIC | Age: 70
End: 2020-12-09
Payer: MEDICARE

## 2020-12-09 VITALS — BODY MASS INDEX: 19.88 KG/M2 | WEIGHT: 108 LBS | HEIGHT: 62 IN

## 2020-12-09 DIAGNOSIS — S32.009G CLOSED FRACTURE OF TRANSVERSE PROCESS OF LUMBAR VERTEBRA WITH DELAYED HEALING, SUBSEQUENT ENCOUNTER: Primary | ICD-10-CM

## 2020-12-09 DIAGNOSIS — S32.030A COMPRESSION FRACTURE OF L3 VERTEBRA, INITIAL ENCOUNTER: ICD-10-CM

## 2020-12-09 DIAGNOSIS — S32.030A CLOSED COMPRESSION FRACTURE OF L3 LUMBAR VERTEBRA, INITIAL ENCOUNTER: ICD-10-CM

## 2020-12-09 DIAGNOSIS — S22.008D CLOSED FRACTURE OF SPINOUS PROCESS OF THORACIC VERTEBRA WITH ROUTINE HEALING: ICD-10-CM

## 2020-12-09 PROCEDURE — 72100 XR LUMBAR SPINE AP AND LATERAL: ICD-10-PCS | Mod: 26,,, | Performed by: RADIOLOGY

## 2020-12-09 PROCEDURE — 72100 X-RAY EXAM L-S SPINE 2/3 VWS: CPT | Mod: 26,,, | Performed by: RADIOLOGY

## 2020-12-09 PROCEDURE — 99214 OFFICE O/P EST MOD 30 MIN: CPT | Mod: PBBFAC,25,PN | Performed by: PHYSICIAN ASSISTANT

## 2020-12-09 PROCEDURE — 99214 PR OFFICE/OUTPT VISIT, EST, LEVL IV, 30-39 MIN: ICD-10-PCS | Mod: S$PBB,,, | Performed by: PHYSICIAN ASSISTANT

## 2020-12-09 PROCEDURE — 99999 PR PBB SHADOW E&M-EST. PATIENT-LVL IV: ICD-10-PCS | Mod: PBBFAC,,, | Performed by: PHYSICIAN ASSISTANT

## 2020-12-09 PROCEDURE — 72100 X-RAY EXAM L-S SPINE 2/3 VWS: CPT | Mod: TC,PN

## 2020-12-09 PROCEDURE — 99999 PR PBB SHADOW E&M-EST. PATIENT-LVL IV: CPT | Mod: PBBFAC,,, | Performed by: PHYSICIAN ASSISTANT

## 2020-12-09 PROCEDURE — 99214 OFFICE O/P EST MOD 30 MIN: CPT | Mod: S$PBB,,, | Performed by: PHYSICIAN ASSISTANT

## 2020-12-09 RX ORDER — METHOCARBAMOL 750 MG/1
750 TABLET, FILM COATED ORAL 2 TIMES DAILY PRN
Qty: 60 TABLET | Refills: 0 | Status: SHIPPED | OUTPATIENT
Start: 2020-12-09 | End: 2021-01-08

## 2020-12-09 NOTE — LETTER
December 10, 2020      Alejandro Jon III, MD  [address] [facility]  [place] [address]           [place] [facility]  [address]  [place] [address]  Phone: [phone]          Patient: Lance Bob   MR Number: [id number]   YOB: 1950   Date of Visit: 12/9/2020       Dear Dr. Alejandro Jon III:    Thank you for referring Lance Bob to me for evaluation. Attached you will find relevant portions of my assessment and plan of care.    If you have questions, please do not hesitate to call me. I look forward to following Lance Bob along with you.    Sincerely,    Kelley Betancourt PA-C    Enclosure  CC:  No Recipients    If you would like to receive this communication electronically, please contact [email or web address] or [phone] to request more information on [facility] access.    For providers and/or their staff who would like to refer a patient to [facility], please contact us through our one-stop-shop provider referral line, [facility] , at [phone].    If you feel you have received this communication in error or would no longer like to receive these types of communications, please e-mail [email or web address]          Oriented - self; Oriented - place; Oriented - time

## 2020-12-10 NOTE — PROGRESS NOTES
"  Subjective:     Patient ID:  Lance Bob is a 70 y.o. male.    Seth    Chief Complaint:  Hospital follow-up.  Thoracic and lumbar fractures    HPI  patient is a poor historian or due to language barrier    Lance Bob is a 70 y.o. male who presents for follow up.    Patient was seen in the hospital by [facility] on November 2, 2020.  The consult was for a T6 spinous process fracture and a left L2 transverse process fracture with possible lytic lesion.  This was after trees fell on him and he fell.  MRI lumbar spine with and without contrast did not show any kind of lesion but it did show an acute L3 compression fracture.  He is here today with updated x-rays around 5 weeks after.    He [facility] by Joaquín Tracy NP.  He had ordered physical therapy for the patient which he has been doing for the past 2 weeks.  Patient states that the therapy has really been helping.  He states he has been doing very good stretching exercises and working on mobility.    He states that he has no midback low back pain or leg pain or paresthesias.  He has been taking hydrocodone every 6 hr and Robaxin every 8 hr since he left the hospital.    No spine surgery.      Patient denies any recent accidents or trauma, no saddle anesthesias, and no bowel or bladder incontinence.      Review of Systems:  Constitution: Negative for chills, fever, night sweats and weight loss.   Musculoskeletal: Negative for falls.   Gastrointestinal: Negative for bowel incontinence, nausea and vomiting.   Genitourinary: Negative for bladder incontinence.   Neurological: Negative for disturbances in coordination and loss of balance.      Objective:      Vitals:    12/09/20 1319   Weight: 49 kg (108 lb 0.4 oz)   Height: 5' 2" (1.575 m)   PainSc: 0-No pain         Physical Exam:      General:  Lance Bob is well-developed, well-nourished, appears stated age, in no acute distress, alert and oriented to " person, place, and time.    Pulmonary/Chest:  Respiratory effort normal  Abdominal: Exhibits no distension  Psychiatric:  Normal mood and affect.  Behavior is normal.  Judgement and thought content normal      Musculoskeletal:    Lumbar ROM:   No pain in lumbar flexion, extension, left lateral bending, and right lateral bending.    Lumbar Spine Inspection:  Normal with no surgical scars with no visible rashes.    Lumbar Spine Palpation:  No tenderness to low back palpation or thoracic spine palpation.    SI Joint Palpation:  No tenderness to SI Joint palpation.    Straight Leg Raise:  Negative right and left SLR.      Neurological:  Alert and oriented to person, place, and time    Muscle strength against resistance:     Right Left   Hip flexion  5 / 5 5 / 5   Hip extension 5 / 5 5 / 5   Hip abduction 5 / 5 5 / 5   Hip adduction  5 / 5 5 / 5   Knee extension  5 / 5 5 / 5   Knee flexion 5 / 5 5 / 5   Dorsiflexion  5 / 5 5 / 5   EHL  5 / 5 5 / 5   Plantar flexion  5 / 5 5 / 5   Inversion of the feet 5 / 5 5 / 5   Eversion of the feet  5 / 5 5 / 5       Reflexes:     Right Left   Patellar 3+ 3+   Achilles 2+ 2+     Clonus:  Negative bilaterally    On gross examination of the bilateral upper extremities, patient has full painfree ROM with no signs of clubbing, cyanosis, edema, or weakness.       XRAY/MRI Results:     Narrative & Impression     EXAMINATION:  XR LUMBAR SPINE AP AND LATERAL     CLINICAL HISTORY:  Compression fracture, lumbar;Wedge compression fracture of third lumbar vertebra, initial encounter for closed fracture     TECHNIQUE:  AP, lateral and spot images were performed of the lumbar spine.     FINDINGS:  There is a mild S-shaped scoliosis of the thoracolumbar spine.  There is grade 1 anterolisthesis of L5 in relation to S1.  The vertebral body heights are satisfactorily preserved.  There is degenerative endplate change throughout the lumbar spine with loss of disc space height at L2-3, L4-5, and L5-S1  and facet hypertrophy at L4-5 and L5-S1.  There is no fracture, dislocation, or bony erosion.  There is calcification of the aorta.     Impression:     As above.        Electronically signed by: Efren Severino MD  Date:                                            12/09/2020  Time:                                           13:15       Impression:     Superior endplate mild compression fracture of the L3 vertebra with mild wedging to the right.  No evidence of fracture retropulsion, canal hematoma or vertebral body listhesis is seen as result.  There is no visible extension a fracture into the posterior elements.     The left transverse process fracture at L2 is not very well demonstrated with MRI without fat saturation in the axial plane.  There is no significant marrow enhancing lesions to support a pathologic fracture related to a mass/neoplasm with certainty.  The fracture could explained by trauma particularly with an adjacent vertebral body compression.  Follow-up is suggested and additional sequences with fat saturation suggested at the time of follow-up.     No suspicious marrow lesions or enhancing lesions in the vertebral bodies are noted as imaged.     Multilevel spondylosis without canal or significant neural foraminal stenosis.  Detailed findings are described above.     Paraspinous pelvic floor muscular edematous changes on the left are likely post-traumatic on the left and are partially visualized.     Multiple renal cystic foci with several incompletely imaged.  These findings are better characterized on the abdomen pelvis CT 11/01/2020.     This report was flagged in Epic as abnormal.        Electronically signed by: Nuria Esparza  Date:                                            11/02/2020  Time:                                           01:54    Assessment:          1. Closed fracture of transverse process of lumbar vertebra with delayed healing, subsequent encounter    2. Closed fracture of spinous  process of thoracic vertebra with routine healing    3. Closed compression fracture of L3 lumbar vertebra, initial encounter            Plan:          Orders Placed This Encounter    X-Ray Lumbar Spine Ap And Lateral    X-Ray Thoracic Spine AP Lateral    methocarbamoL (ROBAXIN) 750 MG Tab     Patient is status post 5 weeks diagnosis of T6 spinous process fracture, left transverse process fracture at L2, and L3 vertebral body fracture    -neurologically stable  -stop taking hydrocodone scheduled  -stop taking Robaxin scheduled  -continue physical therapy for primarily mobility and stretching no excessive range of motion or weights  -will refill robaxin to take as needed  -he would like to go back to work.  He states that he does not do a lot of physical labor heavy lifting and bending.  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  He wanted me to sign off on it which I said I would have to review the paperwork.  This paperwork is not even for the job that he is currently describing an I am confused as to what exactly his work entails.  -if he does not do heavy living lifting her physical labor he can go back to work in 4 weeks  -I do recommend that he continue wearing the back brace for the next 4 weeks  -I will see him back in 6 weeks with repeat thoracic and lumbar x-rays      Follow-Up:  Follow up in about 6 weeks (around 1/20/2021). If there are any questions prior to this, the patient was instructed to contact the office.       Kelley Betancourt, [facility], PA-C  Neurosurgery  [facility]

## 2020-12-11 ENCOUNTER — CLINICAL SUPPORT (OUTPATIENT)
Dept: REHABILITATION | Facility: HOSPITAL | Age: 70
End: 2020-12-11
Payer: MEDICARE

## 2020-12-11 DIAGNOSIS — R29.898 IMPAIRED FLEXIBILITY OF LOWER EXTREMITY: ICD-10-CM

## 2020-12-11 PROCEDURE — 97110 THERAPEUTIC EXERCISES: CPT | Mod: PN,CQ

## 2020-12-11 NOTE — PROGRESS NOTES
"  Physical Therapy Daily Treatment Note     Name: Lance Bob  Clinic Number: [id number]    Therapy Diagnosis:   Encounter Diagnosis   Name Primary?    Impaired flexibility of lower extremity      Physician: Joaquín Tracy NP    Visit Date: 12/11/2020  Physician Orders: PT Eval and Treat   Medical Diagnosis: S32.030A (ICD-10-CM) - Closed compression fracture of L3 lumbar vertebra, initial encounter  M54.5 (ICD-10-CM) - Midline low back pain without sciatica, unspecified chronicity  Evaluation Date: 11/30/2020  Plan of Care Certification Period: 12/31/20  Authorization Dates: 11/18/20 to 12/31/20   Visit # / Visits authorized: 4/50  PTA: 2/6  FOTO: 4/5    Time In: 9:00 am  Time Out: 9:40 am  Total Billable Time: 40 minutes 3TE    Precautions: Standard and no bending lifting twisting    Subjective     Pt reports: Agreeable to PT session. No reports of low back pain   He was compliant with home exercise program.  Response to previous treatment: reduced stiffness  Functional change: improved mobility     Pain: 3/10  Location: bilateral back      Objective     Lance received therapeutic exercises to develop strength, endurance, ROM, flexibility, posture and core stabilization for 40 minutes including:     Pelvic tilts - 20x3"  B gastroc stretch - 3x20"  B piriformis stretch - 3x20"   B hamstring stretch standing - 3x20"   Hip flexor stretch off of mat -   B ITB stretch with strap 3x 20''  Clams - x30  Mini squats 3x10   Step ups 2x10 B on 6"step in // bars  Toe taps (3 cone) 5x2 B  Soccer ball rolls FWD/ BWD 1x30 with 1 UE support 1x30" without UE support    Ambulated indoors on level surface 150 ft with no assistive device and SBA only.     Lance received the following manual therapy techniques: Joint mobilizations, Myofacial release and Soft tissue Mobilization were applied to the: cervioscapular area for 0 minutes:  Not performed today     Home Exercises and Patient Education Provided     Education provided " re:   - cont HEP  - Importance of wearing LSO and following precautions  No spiritual or educational barriers to learning provided     Written Home Exercises Provided: Yes.  Exercises were reviewed and Lance was able to demonstrate them prior to the end of the session.   Pt received a written copy of exercises to perform at home. Lance demonstrated good  understanding of the education provided.      HEP can be found under patient instructions in Media from 11/30/20.    Assessment   Pt able to recall precautions and proper use of lumbar support brace but continues to occasionally need verbal cuing to complete proper transitions from supine to sit. Patient primary complaint was knee pain today. Patient tolerated all exercises well and reported no increase in pain following therapy session today.   Lance is progressing well towards his goals.   Pt prognosis is Good.     Pt will continue to benefit from skilled outpatient physical therapy to address the deficits listed in the problem list box on initial evaluation, provide pt/family education and to maximize pt's level of independence in the home and community environment.     Pt's spiritual, cultural and educational needs considered and pt agreeable to plan of care and goals.    Anticipated barriers to physical therapy: advanced age    Goals:  Short Term Goals: 3 weeks:  1. Patient will demonstrate 8 degree improvement in bilateral hip external rotation PROM for tying of his shoes. (ongoing, not met)  2. Patient will demonstrate an understanding of his precautions and be able to repeat them. (ongoing, not met)  3. Patient will demonstrate > 10 degrees of improvement on bilateral hamstring 90/90 test to help improve gait and stance. (ongoing, not met)     Long Term Goals: 8 weeks:  1. 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 (ongoing, not met)  2. Patient will report a  little bit of difficulty with moderate activities, like moving a table, pushing a vacuum . (ongoing, not met)    Plan   Certification Period: 11/30/2020 to 12/31/20.    Cont to progress flexibility   Franchesca Gutierrez, PTA

## 2020-12-15 ENCOUNTER — CLINICAL SUPPORT (OUTPATIENT)
Dept: REHABILITATION | Facility: HOSPITAL | Age: 70
End: 2020-12-15
Payer: MEDICARE

## 2020-12-15 DIAGNOSIS — R29.898 IMPAIRED FLEXIBILITY OF LOWER EXTREMITY: ICD-10-CM

## 2020-12-15 PROCEDURE — 97140 MANUAL THERAPY 1/> REGIONS: CPT | Mod: PN | Performed by: PHYSICAL THERAPIST

## 2020-12-15 PROCEDURE — 97112 NEUROMUSCULAR REEDUCATION: CPT | Mod: PN | Performed by: PHYSICAL THERAPIST

## 2020-12-15 PROCEDURE — 97110 THERAPEUTIC EXERCISES: CPT | Mod: PN | Performed by: PHYSICAL THERAPIST

## 2020-12-15 NOTE — PROGRESS NOTES
"  Physical Therapy Daily Treatment Note     Name: Lance Bob  Clinic Number: [id number]    Therapy Diagnosis:   Encounter Diagnosis   Name Primary?    Impaired flexibility of lower extremity      Physician: Joaquín Tracy NP    Visit Date: 12/15/2020  Physician Orders: PT Eval and Treat   Medical Diagnosis: S32.030A (ICD-10-CM) - Closed compression fracture of L3 lumbar vertebra, initial encounter  M54.5 (ICD-10-CM) - Midline low back pain without sciatica, unspecified chronicity  Evaluation Date: 11/30/2020  Plan of Care Certification Period: 12/31/20  Authorization Dates: 11/18/20 to 12/31/20   Visit # / Visits authorized: 5/50  PTA: 2/6  FOTO: 5/5 (NEXT)    Time In: 9:20 am  Time Out: 10:08 am  Total Billable Time: 45 minutes (NMR x1, TEx1, MTx1)    Precautions: Standard and no bending lifting twisting    Subjective     Pt reports: Agreeable to PT session. No reports of low back pain   He was compliant with home exercise program.  Response to previous treatment: reduced stiffness  Functional change: improved mobility     Pain: 0/10  Location: bilateral back      Objective     Lance received therapeutic exercises to develop strength, endurance, ROM, flexibility, posture and core stabilization for 15 minutes including:     Mini squats 3x10   B gastroc stretch - 3x20"  B hamstring stretch standing - 3x20"   Step ups 2x10 B on 6"step no UE  Static lunge - add next visit    NOT PERFORMED:  Pelvic tilts - 20x3" (NP)  B piriformis stretch - 3x20" (NP)   Hip flexor stretch off of mat (NP)  B ITB stretch with strap 3x 20'' (NP)  Clams - x30 (NP)  Toe taps (3 cone) 5x2 B (NP)  Soccer ball rolls FWD/ BWD 1x30 with 1 UE support 1x30" without UE support      Lance received the following manual therapy techniques: Joint mobilizations, Myofacial release and Soft tissue Mobilization were applied to the: cervioscapular area for 10 minutes:  Manual hamstring, ITB, Piriformis and glute stretches bilaterally      Lance " "participated in neuromuscular re-education activities to improve: Balance, Coordination and Proprioception for 20 minutes. The following activities were included:    Toe taps   30x each at green step  Foam stance eyes closed 1 min  Foam narrow stance with perturbations - 1 min  Foam marching  1 min  Gait with head turns  Horizontal and vertical 2 laps each  Cross body reach  1 min each     Home Exercises and Patient Education Provided     Education provided re:   - cont HEP  - Importance of wearing LSO and following precautions  - POC  No spiritual or educational barriers to learning provided     Written Home Exercises Provided: Yes.  Exercises were reviewed and Lance was able to demonstrate them prior to the end of the session.   Pt received a written copy of exercises to perform at home. Lance demonstrated good  understanding of the education provided.      HEP can be found under patient instructions in Media from 11/30/20.    Assessment   Pt continues to be able to recall precautions and wears his LSO upon arrival. He has not had pain in several days and is progressing very well. His hips remain very tight, but session was progressed to concentrating on static and dynamic balance. He had no loss of balance or unsteadiness with any of his exercises. His biggest limitation with getting objects from the floor is his limited knee range of motion. He demonstrates a unique way of "squatting" that does follow his precautions. He appears like he will be ready for return to work in about 1 wk.    Lance is progressing well towards his goals.   Pt prognosis is Good.     Pt will continue to benefit from skilled outpatient physical therapy to address the deficits listed in the problem list box on initial evaluation, provide pt/family education and to maximize pt's level of independence in the home and community environment.     Pt's spiritual, cultural and educational needs considered and pt agreeable to plan of care and " goals.    Anticipated barriers to physical therapy: advanced age    Goals:  Short Term Goals: 3 weeks:  1. Patient will demonstrate 8 degree improvement in bilateral hip external rotation PROM for tying of his shoes. (ongoing, not met)  2. Patient will demonstrate an understanding of his precautions and be able to repeat them. GOAL MET; 12/15/20)  3. Patient will demonstrate > 10 degrees of improvement on bilateral hamstring 90/90 test to help improve gait and stance. (ongoing, not met)     Long Term Goals: 8 weeks:  1. 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 (ongoing, not met)  2. Patient will report a little bit of difficulty with moderate activities, like moving a table, pushing a vacuum . (ongoing, not met)    Plan   Certification Period: 11/30/2020 to 12/31/20.    Cont to progress flexibility. Add static lunge to work on picking up objects.  Abel Bo, PT

## 2020-12-16 ENCOUNTER — TELEPHONE (OUTPATIENT)
Dept: NEUROSURGERY | Facility: CLINIC | Age: 70
End: 2020-12-16

## 2020-12-16 NOTE — TELEPHONE ENCOUNTER
Please let patient know that I need paperwork with the exact job description for what his job is now.  He gave me paperwork to sign for a [employer]/ and he said that he does not do this job.    Once I get the paperwork for his current job description I can review that.  Please return his paperwork to him.    Thanks,  Kelley Betancourt, [facility], PA-C  Neurosurgery  [facility]

## 2020-12-16 NOTE — TELEPHONE ENCOUNTER
Called pt to relay msg about bringing paperwork with current job description for laurel to sign, Per laurel. Could not leave a message because pt has no voicemail

## 2020-12-18 ENCOUNTER — CLINICAL SUPPORT (OUTPATIENT)
Dept: REHABILITATION | Facility: HOSPITAL | Age: 70
End: 2020-12-18
Payer: MEDICARE

## 2020-12-18 DIAGNOSIS — S32.030A CLOSED COMPRESSION FRACTURE OF L3 LUMBAR VERTEBRA, INITIAL ENCOUNTER: Primary | ICD-10-CM

## 2020-12-18 DIAGNOSIS — R29.898 IMPAIRED FLEXIBILITY OF LOWER EXTREMITY: ICD-10-CM

## 2020-12-18 PROCEDURE — 97112 NEUROMUSCULAR REEDUCATION: CPT | Mod: PN | Performed by: PHYSICAL THERAPIST

## 2020-12-18 PROCEDURE — 97140 MANUAL THERAPY 1/> REGIONS: CPT | Mod: PN | Performed by: PHYSICAL THERAPIST

## 2020-12-18 PROCEDURE — 97110 THERAPEUTIC EXERCISES: CPT | Mod: PN | Performed by: PHYSICAL THERAPIST

## 2020-12-18 NOTE — PROGRESS NOTES
"  Physical Therapy Daily Treatment Note     Name: Lance Bob  Clinic Number: [id number]    Therapy Diagnosis:   Encounter Diagnosis   Name Primary?    Impaired flexibility of lower extremity      Physician: Joaquín Tracy NP    Visit Date: 12/18/2020  Physician Orders: PT Eval and Treat   Medical Diagnosis: S32.030A (ICD-10-CM) - Closed compression fracture of L3 lumbar vertebra, initial encounter  M54.5 (ICD-10-CM) - Midline low back pain without sciatica, unspecified chronicity  Evaluation Date: 11/30/2020  Plan of Care Certification Period: 12/31/20  Authorization Dates: 11/18/20 to 12/31/20   Visit # / Visits authorized: 6/50  PTA: 0/6  FOTO: 6/5    Time In: 8:45 am  Time Out: 9:30 am  Total Billable Time: 43 minutes (NMR x1, TEx1, MTx1)    Precautions: Standard and no bending lifting twisting    Subjective     Pt reports: Agreeable to PT session. No reports of low back pain   He was compliant with home exercise program.  Response to previous treatment: reduced stiffness  Functional change: improved mobility     Pain: 0/10  Location: bilateral back      Objective     FOTO: 12/18  Intake: 39%  12/18/20: 30%  Predicted: 28%    Lance received therapeutic exercises to develop strength, endurance, ROM, flexibility, posture and core stabilization for 10 minutes including:     Mini squats 3x10   B gastroc stretch - 3x20" (NP)  B hamstring stretch standing - 3x20" (NP)  Step ups 2x10 B on 6"step no UE  Static lunge - 10x each at dance bar    NOT PERFORMED:  Pelvic tilts - 20x3" (NP)  B piriformis stretch - 3x20" (NP)   Hip flexor stretch off of mat (NP)  B ITB stretch with strap 3x 20'' (NP)  Clams - x30 (NP)  Toe taps (3 cone) 5x2 B (NP)  Soccer ball rolls FWD/ BWD 1x30 with 1 UE support 1x30" without UE support      Lance received the following manual therapy techniques: Joint mobilizations, Myofacial release and Soft tissue Mobilization were applied to the: cervioscapular area for 8 minutes:  Manual " hamstring, ITB, Piriformis and glute stretches bilaterally      Lance participated in neuromuscular re-education activities to improve: Balance, Coordination and Proprioception for 25 minutes. The following activities were included:    Toe taps   30x each at green step  Foam stance eyes closed 1 min (progress to narrow CHRISTOPHER next visit)  Foam narrow stance with perturbations - 1 min  Foam marching  1 min  Gait with head turns   Horizontal and vertical 2 laps each (NP)  Foam stance with yellow ball toss - 2 min  Backwards walk in // bars - 3 laps  Hurdles in // bars - 8 total laps (step-to, step-through and side stepping)     Home Exercises and Patient Education Provided     Education provided re:   - cont HEP  - Importance of wearing LSO and following precautions  - POC  No spiritual or educational barriers to learning provided     Written Home Exercises Provided: Yes.  Exercises were reviewed and Lance was able to demonstrate them prior to the end of the session.   Pt received a written copy of exercises to perform at home. Lance demonstrated good  understanding of the education provided.      HEP can be found under patient instructions in Media from 11/30/20.    Assessment   Pt continues to demonstrate improving static and dynamic balance. He remains slightly limited in certain movements due to chronic knee range of motion limitation in his L knee. Due to this limitation, he does have to circumduct with hurdles with his L LE. He had no LOB during his session today.     Lance is progressing well towards his goals.   Pt prognosis is Good.     Pt will continue to benefit from skilled outpatient physical therapy to address the deficits listed in the problem list box on initial evaluation, provide pt/family education and to maximize pt's level of independence in the home and community environment.     Pt's spiritual, cultural and educational needs considered and pt agreeable to plan of care and goals.    Anticipated  barriers to physical therapy: advanced age    Goals:  Short Term Goals: 3 weeks:  1. Patient will demonstrate 8 degree improvement in bilateral hip external rotation PROM for tying of his shoes. (ongoing, not met)  2. Patient will demonstrate an understanding of his precautions and be able to repeat them. GOAL MET; 12/15/20)  3. Patient will demonstrate > 10 degrees of improvement on bilateral hamstring 90/90 test to help improve gait and stance. (ongoing, not met)     Long Term Goals: 8 weeks:  1. 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 (ongoing, not met)  2. Patient will report a little bit of difficulty with moderate activities, like moving a table, pushing a vacuum . (ongoing, not met)    Plan   Certification Period: 11/30/2020 to 12/31/20.    Cont to progress flexibility. Add static lunge to work on picking up objects.  Abel Bo, PT

## 2020-12-21 ENCOUNTER — CLINICAL SUPPORT (OUTPATIENT)
Dept: REHABILITATION | Facility: HOSPITAL | Age: 70
End: 2020-12-21
Payer: MEDICARE

## 2020-12-21 DIAGNOSIS — R29.898 IMPAIRED FLEXIBILITY OF LOWER EXTREMITY: ICD-10-CM

## 2020-12-21 PROCEDURE — 97110 THERAPEUTIC EXERCISES: CPT | Mod: PN,CQ

## 2020-12-21 PROCEDURE — 97140 MANUAL THERAPY 1/> REGIONS: CPT | Mod: PN,CQ

## 2020-12-21 PROCEDURE — 97112 NEUROMUSCULAR REEDUCATION: CPT | Mod: PN,CQ

## 2020-12-21 NOTE — PROGRESS NOTES
"    Physical Therapy Daily Treatment Note     Name: Lance Bob  Clinic Number: [id number]    Therapy Diagnosis:   Encounter Diagnosis   Name Primary?    Impaired flexibility of lower extremity      Physician: Joaquín Tracy NP    Visit Date: 12/21/2020  Physician Orders: PT Eval and Treat   Medical Diagnosis: S32.030A (ICD-10-CM) - Closed compression fracture of L3 lumbar vertebra, initial encounter  M54.5 (ICD-10-CM) - Midline low back pain without sciatica, unspecified chronicity  Evaluation Date: 11/30/2020  Plan of Care Certification Period: 12/31/20  Authorization Dates: 11/18/20 to 12/31/20   Visit # / Visits authorized: 7/50  PTA: 1/6  FOTO: 7/5    Time In: 9:00 am  Time Out: 9:43 am  Total Billable Time: 43 minutes (NMR x1, TEx1, MTx1)    Precautions: Standard and no bending lifting twisting    Subjective     Pt reports: Agreeable to PT session  He was compliant with home exercise program.  Response to previous treatment: reduced stiffness  Functional change: improved mobility     Pain: 0/10  Location: bilateral back      Objective       Lance received therapeutic exercises to develop strength, endurance, ROM, flexibility, posture and core stabilization for 10 minutes including:     Mini squats 3x10   B gastroc stretch - 3x20" (NP)  B hamstring stretch standing - 3x20" (NP)  Step ups 2x10 B on 6"step no UE  Static lunge - 10x each at dance bar    NOT PERFORMED:  Pelvic tilts - 20x3" (NP)  B piriformis stretch - 3x20" (NP)   Hip flexor stretch off of mat (NP)  B ITB stretch with strap 3x 20'' (NP)  Clams - x30 (NP)  Toe taps (3 cone) 5x2 B (NP)  Soccer ball rolls FWD/ BWD 1x30 with 1 UE support 1x30" without UE support      Lance received the following manual therapy techniques: Joint mobilizations, Myofacial release and Soft tissue Mobilization were applied to the: cervioscapular area for 10 minutes:  Manual hamstring, ITB, Piriformis and glute stretches bilaterally      Lance participated in " neuromuscular re-education activities to improve: Balance, Coordination and Proprioception for 23 minutes. The following activities were included:    Toe taps   30x each at green step  Foam stance eyes closed 1 min NBOS  Foam narrow stance with perturbations - 1 min  Foam marching  1 min  Gait with head turns   Horizontal and vertical 2 laps each (NP)  Foam stance with yellow ball toss - 2 min not today  Backwards walk in // bars - 3 laps  Hurdles in // bars - 8 total laps (step-to LLE leading, step-through and side stepping)     Home Exercises and Patient Education Provided     Education provided re:   - cont HEP  - Importance of wearing LSO and following precautions  - POC  No spiritual or educational barriers to learning provided     Written Home Exercises Provided: Yes.  Exercises were reviewed and Lance was able to demonstrate them prior to the end of the session.   Pt received a written copy of exercises to perform at home. Lance demonstrated good  understanding of the education provided.      HEP can be found under patient instructions in Media from 11/30/20.    Assessment   Pt continues to demonstrate improving static and dynamic balance. Due to patients limited L knee ROM patient tends to circumduct with hurdles able to correct with frequent verbal cuing. He had no LOB during his session today.     Lance is progressing well towards his goals.   Pt prognosis is Good.     Pt will continue to benefit from skilled outpatient physical therapy to address the deficits listed in the problem list box on initial evaluation, provide pt/family education and to maximize pt's level of independence in the home and community environment.     Pt's spiritual, cultural and educational needs considered and pt agreeable to plan of care and goals.    Anticipated barriers to physical therapy: advanced age    Goals:  Short Term Goals: 3 weeks:  1. Patient will demonstrate 8 degree improvement in bilateral hip external rotation  PROM for tying of his shoes. (ongoing, not met)  2. Patient will demonstrate an understanding of his precautions and be able to repeat them. GOAL MET; 12/15/20)  3. Patient will demonstrate > 10 degrees of improvement on bilateral hamstring 90/90 test to help improve gait and stance. (ongoing, not met)     Long Term Goals: 8 weeks:  1. 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 (ongoing, not met)  2. Patient will report a little bit of difficulty with moderate activities, like moving a table, pushing a vacuum . (ongoing, not met)    Plan   Certification Period: 11/30/2020 to 12/31/20.    Cont to progress flexibility. Add static lunge to work on picking up objects.  Franchesca Gutierrez, PTA

## 2020-12-23 ENCOUNTER — CLINICAL SUPPORT (OUTPATIENT)
Dept: REHABILITATION | Facility: HOSPITAL | Age: 70
End: 2020-12-23
Payer: MEDICARE

## 2020-12-23 ENCOUNTER — OFFICE VISIT (OUTPATIENT)
Dept: SPINE | Facility: CLINIC | Age: 70
End: 2020-12-23
Payer: MEDICARE

## 2020-12-23 VITALS
BODY MASS INDEX: 19.88 KG/M2 | SYSTOLIC BLOOD PRESSURE: 131 MMHG | WEIGHT: 108 LBS | HEART RATE: 78 BPM | HEIGHT: 62 IN | TEMPERATURE: 97 F | DIASTOLIC BLOOD PRESSURE: 68 MMHG

## 2020-12-23 DIAGNOSIS — R29.898 IMPAIRED FLEXIBILITY OF LOWER EXTREMITY: ICD-10-CM

## 2020-12-23 DIAGNOSIS — S32.030A CLOSED COMPRESSION FRACTURE OF L3 LUMBAR VERTEBRA, INITIAL ENCOUNTER: Primary | ICD-10-CM

## 2020-12-23 DIAGNOSIS — M54.50 MIDLINE LOW BACK PAIN WITHOUT SCIATICA, UNSPECIFIED CHRONICITY: ICD-10-CM

## 2020-12-23 PROCEDURE — 99999 PR PBB SHADOW E&M-EST. PATIENT-LVL III: CPT | Mod: PBBFAC,,, | Performed by: NURSE PRACTITIONER

## 2020-12-23 PROCEDURE — 97140 MANUAL THERAPY 1/> REGIONS: CPT | Mod: PN | Performed by: PHYSICAL THERAPIST

## 2020-12-23 PROCEDURE — 97112 NEUROMUSCULAR REEDUCATION: CPT | Mod: PN | Performed by: PHYSICAL THERAPIST

## 2020-12-23 PROCEDURE — 97110 THERAPEUTIC EXERCISES: CPT | Mod: PN | Performed by: PHYSICAL THERAPIST

## 2020-12-23 PROCEDURE — 99213 PR OFFICE/OUTPT VISIT, EST, LEVL III, 20-29 MIN: ICD-10-PCS | Mod: S$PBB,,, | Performed by: NURSE PRACTITIONER

## 2020-12-23 PROCEDURE — 99213 OFFICE O/P EST LOW 20 MIN: CPT | Mod: PBBFAC | Performed by: NURSE PRACTITIONER

## 2020-12-23 PROCEDURE — 99999 PR PBB SHADOW E&M-EST. PATIENT-LVL III: ICD-10-PCS | Mod: PBBFAC,,, | Performed by: NURSE PRACTITIONER

## 2020-12-23 PROCEDURE — 99213 OFFICE O/P EST LOW 20 MIN: CPT | Mod: S$PBB,,, | Performed by: NURSE PRACTITIONER

## 2020-12-23 NOTE — PROGRESS NOTES
Subjective:      Patient ID: Lance Bob is a 70 y.o. male.    Chief Complaint: Follow-up    Interval History 12/23/2020:  The patient presents for follow up of lower back pain. He is not using walker any longer. He continues to wear back brace. He continues with PT and he has noticeable improvement each visit. The patient denies myelopathic symptoms such as handwriting changes or difficulty with buttons/coins/keys. Denies perineal paresthesias, bowel/bladder dysfunction.      Interval History 12/8/2020:  The patient presents for follow up of lower back pain. He is overall doing well. He is very pleased with progress of Physical Therapy. He continues to use back brace and using walker less frequently. He states last night was 1st night he was able to sleep on stomach without pain. He is eager to return to work. The patient denies myelopathic symptoms such as handwriting changes or difficulty with buttons/coins/keys. Denies perineal paresthesias, bowel/bladder dysfunction.      HPI:  Mr Bob is a 69y/o Male presenting with complaint of back pain since 11/1/2020 when he had tree fall on his back. He went to ER and admitted due to Fx workup and Rhabdomyolysis. Pt Fx found to be non-pathologic T6 and L3. Pt was discharged for HH, back brace and to FU with NS. Pt denies any neurological complaints such as bowel or bladder incontinence, denies saddle paresthesias. 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     Follow-up  Pertinent negatives include no abdominal pain, fever or numbness.       Review of Systems   Constitution: Negative for fever.   Respiratory: Negative for shortness of breath.    Musculoskeletal: Positive for back pain.   Gastrointestinal: Negative for abdominal pain and bowel incontinence.   Genitourinary: Negative for bladder incontinence.   Neurological: Negative for numbness and paresthesias.   Psychiatric/Behavioral: Negative for altered mental status.            Objective:       Imaging:  MRI LUMBAR SPINE W WO CONTRAST     CLINICAL HISTORY:  L/S-spine fracture, pathological; patient post trauma and fall with back injury and L2 left transverse process fracture.     TECHNIQUE:  Multiplanar, multisequence MR images were acquired from the thoracolumbar junction to the sacrum without and with IV injection of 6 cc of Gadavist contrast.     COMPARISON:  CTA of the lumbar spine 11/01/2020     FINDINGS:  Alignment: There is no subluxation.     Vertebrae: Note is made of a superior endplate compression fracture of the L3 vertebral body more pronounced on the right side extending towards the base of the right pedicle.  There is right-sided mild wedge deformity and cortical irregularity with relative levoscoliosis noted on the prior CT as result.  There is no retropulsion into the spinal canal or significant listhesis.     There is no evidence of fracture extension into the right pedicle or posterior elements of the vertebra L3.     The L2 transverse process fracture as noted by CT is seen to a limited degree due to the lack of fat saturation on the axial series but it is present with expected enhancement of the surrounding soft tissues related to edema/posttraumatic changes.  The presence of a definitive soft tissue enhancing mass to suggest pathologic fracture is not confirmed as imaged.  There is also limited visualization of the transverse process fracture on the left on sagittal imaging with fat saturation limiting characterization.     There are no other marrow signal abnormalities throughout the lumbar vertebrae.     Cord: Normal signal in the visualized distal spinal cord is noted.  The conus terminates at the T12-L1 disc level     Degenerative findings:     T12-L1: No dorsal focal disc abnormality or stenosis.     L1-L2: There is moderate spondylosis with broad-based small dorsal disc protrusion more pronounced in the left foraminal region.  There is no canal  stenosis or significant neural foraminal stenosis.     L2-L3: B mild-to-moderate spondylosis with broad-based small dorsal annular bulge/protrusion is noted.  There is no canal or neural foraminal stenosis.  Facet hypertrophic changes are present.     L3-L4: There is minor dorsal annular bulging without significant disc protrusion or canal or neural foraminal stenosis.  Mild facet hypertrophy bilaterally is noted.     L4-L5: There is broad-based small disc protrusion in the canal which extends to the neural foramina bilaterally.  There is no canal stenosis.  Facet and ligamentous mild bilateral hypertrophy is noted contributing to mild bilateral inferior neural foraminal stenosis.     L5-S1: There is moderate spondylosis and a small broad-based dorsal disc protrusion.  There is no canal stenosis or significant neural foraminal stenosis.  Mild to moderate bilateral facet hypertrophic changes are present.     Sacroiliac joint degenerative changes are seen.     Paraspinous soft tissues and musculature are remarkable for muscular edematous changes in the left pelvic floor musculature which is only seen on a few slices of the sagittal series 7 images 11 through 16.     Multiple cystic foci in the kidneys with dominant lesion on the right noted with several incompletely visualized.     Impression:     Superior endplate mild compression fracture of the L3 vertebra with mild wedging to the right.  No evidence of fracture retropulsion, canal hematoma or vertebral body listhesis is seen as result.  There is no visible extension a fracture into the posterior elements.     The left transverse process fracture at L2 is not very well demonstrated with MRI without fat saturation in the axial plane.  There is no significant marrow enhancing lesions to support a pathologic fracture related to a mass/neoplasm with certainty.  The fracture could explained by trauma particularly with an adjacent vertebral body compression.  Follow-up  is suggested and additional sequences with fat saturation suggested at the time of follow-up.     No suspicious marrow lesions or enhancing lesions in the vertebral bodies are noted as imaged.     Multilevel spondylosis without canal or significant neural foraminal stenosis.  Detailed findings are described above.     Paraspinous pelvic floor muscular edematous changes on the left are likely post-traumatic on the left and are partially visualized.     Multiple renal cystic foci with several incompletely imaged.  These findings are better characterized on the abdomen pelvis CT 11/01/2020.     This report was flagged in Epic as abnormal.    CT CERVICAL SPINE WITHOUT CONTRAST; CT LUMBAR SPINE WITHOUT CONTRAST; CT THORACIC SPINE WITHOUT CONTRAST     CLINICAL HISTORY:  Neck trauma (Age => 64y);; Back pain or radiculopathy, trauma;; Mid-back trauma;     TECHNIQUE:  Multiplanar CT images of the cervical, thoracic, and lumbar spine were performed without the administration of contrast.     COMPARISON:  CT chest abdomen pelvis 11/01/2020 and CT scan abdomen pelvis dated 05/21/2015.     FINDINGS:  CERVICAL     Alignment: Normal.     Vertebrae: Vertebral body heights are well maintained.  No evidence of fractures.     Skull base and craniocervical junction: Unremarkable     Multilevel cervical spondylolysis with subchondral cystic change, endplate erosion, uncovertebral spurring, facet arthropathy, and posterior osteophyte disc complexes.  Limited evaluation of intraspinal compartment reveals likely moderate canal stenosis at C3-4 and C5-6.  Variable degrees of foraminal narrowing     THORACIC     Normal kyphotic alignment of the thoracic spine.  Mild S-shaped scoliosis of the thoracolumbar spine.     Mild inferior endplate irregularity at T5 through T10, likely Schmorl's nodes.  Vertebral body heights are otherwise well maintained.     Acute nondisplaced fracture of the T6 spinous process (series 602, image 61).     No  significant spinal canal stenosis or neural foraminal narrowing.     LUMBAR     Alignment: Normal.     Vertebral body heights are well maintained.  There is desiccation of the discs at L4-5 and L5-S1 consistent with degenerative change.  There is a lytic lesion centered in the left transverse process at L2.  There is suspected pathological fracture of the left L2 transverse process.     Degenerative findings:     Mild multilevel lumbar spondylosis with mild facet arthropathy, anterior osteophytes, and posterior disc bulges.  No significant spinal canal stenosis.  Variable neural foraminal narrowing     Paraspinal muscles & soft tissues: See same day chest abdomen pelvis report for detailed organ evaluation of the thorax and abdominopelvic compartments.     Impression:     Acute nondisplaced fracture of the T6 spinous process.     Lytic lesion centered in the left L2 transverse process with suspected pathological fracture of the left transverse process at L2.  MRI of the lumbar spine without and with contrast is suggested for further evaluation.     Multilevel cervical spondylolysis with likely moderate canal stenosis at C3-4 and C5-6.     Mild multilevel lumbar spondylosis with no significant spinal canal stenosis.     This report was flagged in Epic as abnormal.    Past Medical History:   Diagnosis Date    Closed compression fracture of L3 lumbar vertebra, initial encounter 11/03/2020    due to tree falling on back    Vitamin D deficiency 11/3/2020       Past Surgical History:   Procedure Laterality Date    HERNIA REPAIR         History reviewed. No pertinent family history.    Social History     Socioeconomic History    Marital status:      Spouse name: Not on file    Number of children: Not on file    Years of education: Not on file    Highest education level: Not on file   Occupational History    Not on file   Social Needs    Financial resource strain: Not on file    Food insecurity     Worry:  Not on file     Inability: Not on file    Transportation needs     Medical: Not on file     Non-medical: Not on file   Tobacco Use    Smoking status: Current Every Day Smoker     Packs/day: 0.50    Smokeless tobacco: Never Used    Tobacco comment: started. 2005 used to quit for years    Substance and Sexual Activity    Alcohol use: No    Drug use: Not Currently    Sexual activity: Not Currently   Lifestyle    Physical activity     Days per week: Not on file     Minutes per session: Not on file    Stress: Not on file   Relationships    Social connections     Talks on phone: Not on file     Gets together: Not on file     Attends Scientology service: Not on file     Active member of club or organization: Not on file     Attends meetings of clubs or organizations: Not on file     Relationship status: Not on file   Other Topics Concern    Not on file   Social History Narrative    Patient is originally from [facility]        School at [employer]        [employer] ; high school         Working ; not working            Hannah choudhary wife             Children        Lives with         Diet/Exericse           Current Outpatient Medications   Medication Sig Dispense Refill    amLODIPine (NORVASC) 5 MG tablet Take 1 tablet (5 mg total) by mouth once daily. 30 tablet 11    ergocalciferol (ERGOCALCIFEROL) 50,000 unit Cap Take 1 capsule (50,000 Units total) by mouth every 7 days. 4 capsule 2    ibuprofen (ADVIL,MOTRIN) 200 MG tablet Take 400 mg by mouth every 6 (six) hours as needed (headache).      methocarbamoL (ROBAXIN) 750 MG Tab Take 1 tablet (750 mg total) by mouth 2 (two) times daily as needed. 60 tablet 0    multivitamin (ONE DAILY MULTIVITAMIN) per tablet Take 1 tablet by mouth once daily.       No current facility-administered medications for this visit.        Review of patient's allergies indicates:  No Known Allergies         Past Medical History:   Diagnosis Date    Closed compression  fracture of L3 lumbar vertebra, initial encounter 11/03/2020    due to tree falling on back    Vitamin D deficiency 11/3/2020       Past Surgical History:   Procedure Laterality Date    HERNIA REPAIR         History reviewed. No pertinent family history.    Social History     Socioeconomic History    Marital status:      Spouse name: Not on file    Number of children: Not on file    Years of education: Not on file    Highest education level: Not on file   Occupational History    Not on file   Social Needs    Financial resource strain: Not on file    Food insecurity     Worry: Not on file     Inability: Not on file    Transportation needs     Medical: Not on file     Non-medical: Not on file   Tobacco Use    Smoking status: Current Every Day Smoker     Packs/day: 0.50    Smokeless tobacco: Never Used    Tobacco comment: started. 2005 used to quit for years    Substance and Sexual Activity    Alcohol use: No    Drug use: Not Currently    Sexual activity: Not Currently   Lifestyle    Physical activity     Days per week: Not on file     Minutes per session: Not on file    Stress: Not on file   Relationships    Social connections     Talks on phone: Not on file     Gets together: Not on file     Attends Jainism service: Not on file     Active member of club or organization: Not on file     Attends meetings of clubs or organizations: Not on file     Relationship status: Not on file   Other Topics Concern    Not on file   Social History Narrative    Patient is originally from [facility]        School at [employer]        [employer] ; high school         Working ; not working            Hannah choudhary wife             Children        Lives with         Diet/Exericse           Current Outpatient Medications   Medication Sig Dispense Refill    amLODIPine (NORVASC) 5 MG tablet Take 1 tablet (5 mg total) by mouth once daily. 30 tablet 11    ergocalciferol (ERGOCALCIFEROL) 50,000 unit  Cap Take 1 capsule (50,000 Units total) by mouth every 7 days. 4 capsule 2    ibuprofen (ADVIL,MOTRIN) 200 MG tablet Take 400 mg by mouth every 6 (six) hours as needed (headache).      methocarbamoL (ROBAXIN) 750 MG Tab Take 1 tablet (750 mg total) by mouth 2 (two) times daily as needed. 60 tablet 0    multivitamin (ONE DAILY MULTIVITAMIN) per tablet Take 1 tablet by mouth once daily.       No current facility-administered medications for this visit.        Review of patient's allergies indicates:  No Known Allergies    General: Lance LEE is well-developed, well-nourished, appears stated age, in no acute distress, alert and oriented to time, place and person.     General    Nursing note and vitals reviewed.  Constitutional: He is oriented to person, place, and time. He appears well-developed and well-nourished.   Eyes: Conjunctivae are normal.   Cardiovascular: Normal rate.    Pulmonary/Chest: Effort normal.   Neurological: He is alert and oriented to person, place, and time.   Psychiatric: He has a normal mood and affect. His behavior is normal.         Back (L-Spine & T-Spine) / Neck (C-Spine) Exam     Comments:  Wearing back brace and using walker at this time  There is no midline TTP, +facet loading to lower lumbar   (-) slump bilaterally  No change in sensation to L2-S1 Dermatome   Using rolling walker but able to ambulate without easily.       Muscle Strength   Right Lower Extremity   Hip Flexion: 5/5   Quadriceps:  5/5   Hamstrin/5   Anterior tibial:  5/5   Gastrocsoleus:  5/5   EHL:  4/5  Left Lower Extremity   Hip Flexion: 5/5   Quadriceps:  5/5   Hamstrin/5   Anterior tibial:  5/5   Gastrocsoleus:  5/5   EHL:  4/5    Reflexes     Left Side  Achilles:  2+  Babinski Sign:  absent  Ankle Clonus:  absent  Quadriceps:  2+    Right Side   Achilles:  2+  Babinski Sign:  absent  Ankle Clonus:  absent  Quadriceps:  3+              Assessment:       1. Closed compression fracture of L3 lumbar vertebra,  initial encounter    2. Midline low back pain without sciatica, unspecified chronicity           Plan:          - Prior records reviewed  - He continues to have obvious physical strength improvements.  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    - RTC PRN    Follow-up: No follow-ups on file. If there are any questions prior to this, the patient was instructed to contact the office.

## 2020-12-23 NOTE — PROGRESS NOTES
"    Physical Therapy Daily Treatment Note     Name: Lance Bob  Clinic Number: [id number]    Therapy Diagnosis:   Encounter Diagnosis   Name Primary?    Impaired flexibility of lower extremity      Physician: Joaquín Tracy NP    Visit Date: 12/23/2020  Physician Orders: PT Eval and Treat   Medical Diagnosis: S32.030A (ICD-10-CM) - Closed compression fracture of L3 lumbar vertebra, initial encounter  M54.5 (ICD-10-CM) - Midline low back pain without sciatica, unspecified chronicity  Evaluation Date: 11/30/2020  Plan of Care Certification Period: 12/31/20  Authorization Dates: 11/18/20 to 12/31/20   Visit # / Visits authorized: 7/50  PTA: 1/6  FOTO: 7/5    Time In: 10:15 am  Time Out: 11:00 am  Total Billable Time: 43 minutes (NMR x1, TEx1, MTx1)    Precautions: Standard and no bending lifting twisting    Subjective     Pt reports: his doctor is waiting for x-ray results from his ortho prior to clearing for return to work.  He was compliant with home exercise program.  Response to previous treatment: reduced stiffness  Functional change: improved mobility     Pain: 0/10  Location: bilateral back      Objective       Lance received therapeutic exercises to develop strength, endurance, ROM, flexibility, posture and core stabilization for 10 minutes including:     Mini squats 3x10 (tactile cues)  B gastroc stretch - 3x20" (NP)  B hamstring stretch standing - 3x20" (NP)  Step ups 2x10 B on 6"step no UE  Static lunge - 2x10 each at dance bar    NOT PERFORMED:  Pelvic tilts - 20x3" (NP)  B piriformis stretch - 3x20" (NP)   Hip flexor stretch off of mat (NP)  B ITB stretch with strap 3x 20'' (NP)  Clams - x30 (NP)  Toe taps (3 cone) 5x2 B (NP)  Soccer ball rolls FWD/ BWD 1x30 with 1 UE support 1x30" without UE support (NP)      Lance received the following manual therapy techniques: Joint mobilizations, Myofacial release and Soft tissue Mobilization were applied to the: cervioscapular area for 10 " minutes:  Manual hamstring, ITB, Piriformis and glute stretches bilaterally      Lance participated in neuromuscular re-education activities to improve: Balance, Coordination and Proprioception for 23 minutes. The following activities were included:    Toe taps   30x each at green step  Foam stance eyes closed 1 min NBOS  Foam narrow stance with perturbations - 1 min  Foam marching  1 min  Gait with head turns   Horizontal and vertical 2 laps each (NP)  Foam stance with yellow ball toss - 2 min not today  Backwards walk in // bars - 3 laps  Hurdles in // bars - 8 total laps (step-to LLE leading, step-through and side stepping)     Home Exercises and Patient Education Provided     Education provided re:   - cont HEP  - Importance of wearing LSO and following precautions  - POC  No spiritual or educational barriers to learning provided     Written Home Exercises Provided: Yes.  Exercises were reviewed and Lance was able to demonstrate them prior to the end of the session.   Pt received a written copy of exercises to perform at home. Lance demonstrated good  understanding of the education provided.      HEP can be found under patient instructions in Media from 11/30/20.    Assessment   A lot of time was taken today to improve squatting technique. He had a lot of difficulty comprehending and performing a hip hinge. He would either perform holding on to a railing with too much of a posterior weight shift, or too anterior of knee displacement. He finally performed 10 good reps with tactile cueing.     Lance is progressing well towards his goals.   Pt prognosis is Good.     Pt will continue to benefit from skilled outpatient physical therapy to address the deficits listed in the problem list box on initial evaluation, provide pt/family education and to maximize pt's level of independence in the home and community environment.     Pt's spiritual, cultural and educational needs considered and pt agreeable to plan of care  and goals.    Anticipated barriers to physical therapy: advanced age    Goals:  Short Term Goals: 3 weeks:  1. Patient will demonstrate 8 degree improvement in bilateral hip external rotation PROM for tying of his shoes. (ongoing, not met)  2. Patient will demonstrate an understanding of his precautions and be able to repeat them. GOAL MET; 12/15/20)  3. Patient will demonstrate > 10 degrees of improvement on bilateral hamstring 90/90 test to help improve gait and stance. (ongoing, not met)     Long Term Goals: 8 weeks:  1. 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 (ongoing, not met)  2. Patient will report a little bit of difficulty with moderate activities, like moving a table, pushing a vacuum . (ongoing, not met)    Plan   Certification Period: 11/30/2020 to 12/31/20.    Cont to progress flexibility. Add static lunge to work on picking up objects.  Abel Bo, PT

## 2020-12-28 ENCOUNTER — CLINICAL SUPPORT (OUTPATIENT)
Dept: REHABILITATION | Facility: HOSPITAL | Age: 70
End: 2020-12-28
Payer: MEDICARE

## 2020-12-28 DIAGNOSIS — R29.898 IMPAIRED FLEXIBILITY OF LOWER EXTREMITY: ICD-10-CM

## 2020-12-28 PROCEDURE — 97112 NEUROMUSCULAR REEDUCATION: CPT | Mod: PN | Performed by: PHYSICAL THERAPIST

## 2020-12-28 PROCEDURE — 97110 THERAPEUTIC EXERCISES: CPT | Mod: PN | Performed by: PHYSICAL THERAPIST

## 2020-12-28 NOTE — PROGRESS NOTES
"    Physical Therapy Daily Treatment Note     Name: Lance Bob  Clinic Number: [id number]    Therapy Diagnosis:   Encounter Diagnosis   Name Primary?    Impaired flexibility of lower extremity      Physician: Joaquín Tracy NP    Visit Date: 12/28/2020  Physician Orders: PT Eval and Treat   Medical Diagnosis: S32.030A (ICD-10-CM) - Closed compression fracture of L3 lumbar vertebra, initial encounter  M54.5 (ICD-10-CM) - Midline low back pain without sciatica, unspecified chronicity  Evaluation Date: 11/30/2020  Plan of Care Certification Period: 12/31/20  Authorization Dates: 11/18/20 to 12/31/20   Visit # / Visits authorized: 8/50  PTA: 0/6  FOTO: PERFORM AT DC    Time In: 10:15 am  Time Out: 11:00 am  Total Billable Time: 43 minutes (NMR x2, TEx1)    Precautions: Standard and no bending lifting twisting    Subjective     Pt reports: next appointment is not until 1/20, but his job told him they can only hold his job until the end of Jan.   Response to previous treatment: reduced stiffness  Functional change: improved mobility     Pain: 0/10  Location: bilateral back      Objective       Lance received therapeutic exercises to develop strength, endurance, ROM, flexibility, posture and core stabilization for 20 minutes including:     Mini squats 3x10   Step ups 2x10 B on 6"step no UE  Static lunge - 2x10 each at dance bar  B gastroc stretch at fitter - 3x20"   B hamstring stretch standing - 3x20"  B seated piriformis stretch - 3x20"     NOT PERFORMED:  Pelvic tilts - 20x3" (NP)  B piriformis stretch - 3x20" (NP)   Hip flexor stretch off of mat (NP)  B ITB stretch with strap 3x 20'' (NP)  Clams - x30 (NP)  Toe taps (3 cone) 5x2 B (NP)  Soccer ball rolls FWD/ BWD 1x30 with 1 UE support 1x30" without UE support (NP)        Lance received the following manual therapy techniques: Joint mobilizations, Myofacial release and Soft tissue Mobilization were applied to the: cervioscapular area for 0 minutes:  Manual " hamstring, ITB, Piriformis and glute stretches bilaterally      Lance participated in neuromuscular re-education activities to improve: Balance, Coordination and Proprioception for 23 minutes. The following activities were included:    Toe taps   30x each at green step  Foam stance eyes closed 1 min NBOS  Foam narrow stance with perturbations - 1 min  Foam marching  1 min  Gait with head turns   Horizontal and vertical 2 laps each (NP)  Foam stance with yellow ball toss - 2 min   Backwards walk in // bars - 4 laps  Hurdles in // bars - 8 total laps (step-to LLE leading, step-through and side stepping)     Home Exercises and Patient Education Provided     Education provided re:   - cont HEP  - Importance of wearing LSO and following precautions  - POC  No spiritual or educational barriers to learning provided     Written Home Exercises Provided: Yes.  Exercises were reviewed and Lance was able to demonstrate them prior to the end of the session.   Pt received a written copy of exercises to perform at home. Lance demonstrated good  understanding of the education provided.      HEP can be found under patient instructions in Media from 11/30/20.    Assessment   Lance reports practicing squats at home after last visit and arrived today with much better form. He needed mild cues to avoid excessive posterior weight shift, but otherwise did well. He will always have some limitation due to chronic range of motion restrictions. Stretches were progressed away from manual stretches and into self stretches.    Lance is progressing well towards his goals.   Pt prognosis is Good.     Pt will continue to benefit from skilled outpatient physical therapy to address the deficits listed in the problem list box on initial evaluation, provide pt/family education and to maximize pt's level of independence in the home and community environment.     Pt's spiritual, cultural and educational needs considered and pt agreeable to plan of  care and goals.    Anticipated barriers to physical therapy: advanced age    Goals:  Short Term Goals: 3 weeks:  1. Patient will demonstrate 8 degree improvement in bilateral hip external rotation PROM for tying of his shoes. (ongoing, not met)  2. Patient will demonstrate an understanding of his precautions and be able to repeat them. GOAL MET; 12/15/20)  3. Patient will demonstrate > 10 degrees of improvement on bilateral hamstring 90/90 test to help improve gait and stance. (ongoing, not met)     Long Term Goals: 8 weeks:  1. 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 (ongoing, not met)  2. Patient will report a little bit of difficulty with moderate activities, like moving a table, pushing a vacuum . (ongoing, not met)    Plan   Certification Period: 11/30/2020 to 12/31/20.    Cont to progress flexibility. Add static lunge to work on picking up objects.  Abel Bo, PT

## 2020-12-29 ENCOUNTER — TELEPHONE (OUTPATIENT)
Dept: NEUROSURGERY | Facility: CLINIC | Age: 70
End: 2020-12-29

## 2020-12-29 NOTE — TELEPHONE ENCOUNTER
Spoke to the patient and he wants to be seen sooner leighann to he would like to go back to work asap.

## 2020-12-29 NOTE — TELEPHONE ENCOUNTER
Tried calling pt again and voicemail box has not been set up. Could not leave message for pt to return call.

## 2020-12-29 NOTE — TELEPHONE ENCOUNTER
----- Message from Dianne Dawkins sent at 12/29/2020 12:02 PM CST -----  Contact: patient/  [phone]  Lance Bob calling requesting to be seen before 1-20-21 for f/u compression fracture   Please advise

## 2020-12-29 NOTE — TELEPHONE ENCOUNTER
I had given Cassi his work forms to return to the patient. They were not for the job he is currently doing.  I am waiting on work forms for his current job so that I can fill them out for him to return to work.  Please get his current work job duties and paperwork so that I can fill them out.  He does not need to be seen in clinic for me to fill out work forms.    Thanks,  Kelley Betancourt, [facility], PA-C  Neurosurgery  [facility]

## 2020-12-30 ENCOUNTER — CLINICAL SUPPORT (OUTPATIENT)
Dept: REHABILITATION | Facility: HOSPITAL | Age: 70
End: 2020-12-30
Payer: MEDICARE

## 2020-12-30 DIAGNOSIS — R29.898 IMPAIRED FLEXIBILITY OF LOWER EXTREMITY: ICD-10-CM

## 2020-12-30 PROCEDURE — 97112 NEUROMUSCULAR REEDUCATION: CPT | Mod: PN | Performed by: PHYSICAL THERAPIST

## 2020-12-30 PROCEDURE — 97110 THERAPEUTIC EXERCISES: CPT | Mod: PN | Performed by: PHYSICAL THERAPIST
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
## 2020-12-30 NOTE — PROGRESS NOTES
"    Physical Therapy Daily Treatment Note     Name: Lance Bob  Clinic Number: [id number]    Therapy Diagnosis:   Encounter Diagnosis   Name Primary?    Impaired flexibility of lower extremity      Physician: Joaquín Tracy NP    Visit Date: 12/30/2020  Physician Orders: PT Eval and Treat   Medical Diagnosis: S32.030A (ICD-10-CM) - Closed compression fracture of L3 lumbar vertebra, initial encounter  M54.5 (ICD-10-CM) - Midline low back pain without sciatica, unspecified chronicity  Evaluation Date: 11/30/2020  Plan of Care Certification Period: 12/31/20  Authorization Dates: 11/18/20 to 12/31/20   Visit # / Visits authorized: 10/50  PTA: 0/6  FOTO: PERFORM AT DC    Time In: 8:00 am  Time Out: 8:45 am  Total Billable Time: 40 minutes (NMR, TEx2)    Precautions: Standard and no bending lifting twisting    Subjective     Pt reports: next appointment is not until 1/20, but his job told him they can only hold his job until the end of Jan.   Response to previous treatment: reduced stiffness  Functional change: improved mobility     Pain: 0/10  Location: bilateral back      Objective       Lance received therapeutic exercises to develop strength, endurance, ROM, flexibility, posture and core stabilization for 28 minutes including:     Mini squats 3x10   Stairs with reciprocal pattern - 4 steps 5x no handrails  Static lunge - 2x10 each at dance bar  B gastroc stretch at fitter - 3x20"   B hamstring stretch standing - 3x20"  B seated piriformis stretch - 3x20"   B SKTC - 3x20"  Lateral walks in // bars - RTB 4 laps    NOT PERFORMED:  Pelvic tilts - 20x3" (NP)  Hip flexor stretch off of mat (NP)  B ITB stretch with strap 3x 20'' (NP)  Clams - x30 (NP)  Toe taps (3 cone) 5x2 B (NP)  Soccer ball rolls FWD/ BWD 1x30 with 1 UE support 1x30" without UE support (NP)        Lance received the following manual therapy techniques: Joint mobilizations, Myofacial release and Soft tissue Mobilization were applied to the: " cervioscapular area for 0 minutes:  Manual hamstring, ITB, Piriformis and glute stretches bilaterally      Lance participated in neuromuscular re-education activities to improve: Balance, Coordination and Proprioception for 12 minutes. The following activities were included:    Toe taps   30x each at green step - not today  Gait with perturbations 50 ft 2 laps  Foam marching  1 min  Gait with head turns   Horizontal and vertical 2 laps each (50 ft)  Foam stance with yellow ball toss - 2 min (NP)  Backwards walk  - 2 laps 50 ft   Hurdles in // bars - 8 total laps (step-to LLE leading, step-through and side stepping) (NP)     Home Exercises and Patient Education Provided     Education provided re:   - cont HEP  - Importance of wearing LSO and following precautions  - POC  No spiritual or educational barriers to learning provided     Written Home Exercises Provided: Yes.  Exercises were reviewed and Lance was able to demonstrate them prior to the end of the session.   Pt received a written copy of exercises to perform at home. Lance demonstrated good  understanding of the education provided.      HEP can be found under patient instructions in Media from 11/30/20.    Assessment   Lance has completed 10 PT visits and demonstrates improving functional movement patterns, good static and dynamic balance and improving strength. He continues to have poor flexibility, but these deficits have been present chronically. He has not complained of pain in weeks. Based on his described work duties, he appears ready for return to work.     Lance is progressing well towards his goals.   Pt prognosis is Good.     Pt will continue to benefit from skilled outpatient physical therapy to address the deficits listed in the problem list box on initial evaluation, provide pt/family education and to maximize pt's level of independence in the home and community environment.     Pt's spiritual, cultural and educational needs considered and  pt agreeable to plan of care and goals.    Anticipated barriers to physical therapy: advanced age    Goals:  Short Term Goals: 3 weeks:  1. Patient will demonstrate 8 degree improvement in bilateral hip external rotation PROM for tying of his shoes. (ongoing, not met)  2. Patient will demonstrate an understanding of his precautions and be able to repeat them. GOAL MET; 12/15/20  3. Patient will demonstrate > 10 degrees of improvement on bilateral hamstring 90/90 test to help improve gait and stance. GOAL MET: 12/30/20     Long Term Goals: 8 weeks:  1. 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 (ongoing, not met)  2. Patient will report a little bit of difficulty with moderate activities, like moving a table, pushing a vacuum . (ongoing, not met)    Plan   Certification Period: 11/30/2020 to 12/31/20.    Cont to progress flexibility. Add static lunge to work on picking up objects.  Abel Bo, PT

## 2021-01-05 ENCOUNTER — TELEPHONE (OUTPATIENT)
Dept: NEUROSURGERY | Facility: CLINIC | Age: 71
End: 2021-01-05

## 2021-01-05 ENCOUNTER — CLINICAL SUPPORT (OUTPATIENT)
Dept: REHABILITATION | Facility: HOSPITAL | Age: 71
End: 2021-01-05
Payer: MEDICARE

## 2021-01-05 DIAGNOSIS — R29.898 IMPAIRED FLEXIBILITY OF LOWER EXTREMITY: ICD-10-CM

## 2021-01-05 PROCEDURE — 97112 NEUROMUSCULAR REEDUCATION: CPT | Mod: PN | Performed by: PHYSICAL THERAPIST

## 2021-01-05 PROCEDURE — 97110 THERAPEUTIC EXERCISES: CPT | Mod: PN | Performed by: PHYSICAL THERAPIST

## 2021-01-06 ENCOUNTER — OFFICE VISIT (OUTPATIENT)
Dept: NEUROSURGERY | Facility: CLINIC | Age: 71
End: 2021-01-06
Payer: MEDICARE

## 2021-01-06 VITALS — DIASTOLIC BLOOD PRESSURE: 82 MMHG | SYSTOLIC BLOOD PRESSURE: 153 MMHG | HEART RATE: 105 BPM

## 2021-01-06 DIAGNOSIS — S32.009D CLOSED FRACTURE OF TRANSVERSE PROCESS OF LUMBAR VERTEBRA WITH ROUTINE HEALING, SUBSEQUENT ENCOUNTER: ICD-10-CM

## 2021-01-06 DIAGNOSIS — S22.008D CLOSED FRACTURE OF SPINOUS PROCESS OF THORACIC VERTEBRA WITH ROUTINE HEALING, SUBSEQUENT ENCOUNTER: ICD-10-CM

## 2021-01-06 DIAGNOSIS — S32.030D COMPRESSION FRACTURE OF L3 VERTEBRA WITH ROUTINE HEALING, SUBSEQUENT ENCOUNTER: Primary | ICD-10-CM

## 2021-01-06 PROCEDURE — 99214 OFFICE O/P EST MOD 30 MIN: CPT | Mod: S$PBB,,, | Performed by: PHYSICIAN ASSISTANT

## 2021-01-06 PROCEDURE — 99999 PR PBB SHADOW E&M-EST. PATIENT-LVL III: ICD-10-PCS | Mod: PBBFAC,,, | Performed by: PHYSICIAN ASSISTANT

## 2021-01-06 PROCEDURE — 99214 PR OFFICE/OUTPT VISIT, EST, LEVL IV, 30-39 MIN: ICD-10-PCS | Mod: S$PBB,,, | Performed by: PHYSICIAN ASSISTANT

## 2021-01-06 PROCEDURE — 99213 OFFICE O/P EST LOW 20 MIN: CPT | Mod: PBBFAC,PN | Performed by: PHYSICIAN ASSISTANT

## 2021-01-06 PROCEDURE — 99999 PR PBB SHADOW E&M-EST. PATIENT-LVL III: CPT | Mod: PBBFAC,,, | Performed by: PHYSICIAN ASSISTANT

## 2021-01-07 ENCOUNTER — CLINICAL SUPPORT (OUTPATIENT)
Dept: REHABILITATION | Facility: HOSPITAL | Age: 71
End: 2021-01-07
Payer: MEDICARE

## 2021-01-07 DIAGNOSIS — R29.898 IMPAIRED FLEXIBILITY OF LOWER EXTREMITY: ICD-10-CM

## 2021-01-07 PROCEDURE — 97112 NEUROMUSCULAR REEDUCATION: CPT | Mod: PN,CQ

## 2021-01-07 PROCEDURE — 97110 THERAPEUTIC EXERCISES: CPT | Mod: PN,CQ

## 2021-01-12 ENCOUNTER — CLINICAL SUPPORT (OUTPATIENT)
Dept: REHABILITATION | Facility: HOSPITAL | Age: 71
End: 2021-01-12
Payer: MEDICARE

## 2021-01-12 DIAGNOSIS — R29.898 IMPAIRED FLEXIBILITY OF LOWER EXTREMITY: ICD-10-CM

## 2021-01-12 PROCEDURE — 97530 THERAPEUTIC ACTIVITIES: CPT | Mod: PN | Performed by: PHYSICAL THERAPIST

## 2021-01-12 PROCEDURE — 97110 THERAPEUTIC EXERCISES: CPT | Mod: PN | Performed by: PHYSICAL THERAPIST

## 2021-02-08 ENCOUNTER — HOSPITAL ENCOUNTER (OUTPATIENT)
Dept: RADIOLOGY | Facility: HOSPITAL | Age: 71
Discharge: HOME | End: 2021-02-08
Attending: PHYSICIAN ASSISTANT
Payer: MEDICARE

## 2021-02-08 ENCOUNTER — OFFICE VISIT (OUTPATIENT)
Dept: NEUROSURGERY | Facility: CLINIC | Age: 71
End: 2021-02-08
Payer: MEDICARE

## 2021-02-08 VITALS
DIASTOLIC BLOOD PRESSURE: 75 MMHG | HEIGHT: 62 IN | BODY MASS INDEX: 19.76 KG/M2 | HEART RATE: 98 BPM | SYSTOLIC BLOOD PRESSURE: 146 MMHG

## 2021-02-08 DIAGNOSIS — S32.030A CLOSED COMPRESSION FRACTURE OF L3 LUMBAR VERTEBRA, INITIAL ENCOUNTER: ICD-10-CM

## 2021-02-08 DIAGNOSIS — S32.030D COMPRESSION FRACTURE OF L3 VERTEBRA WITH ROUTINE HEALING, SUBSEQUENT ENCOUNTER: ICD-10-CM

## 2021-02-08 DIAGNOSIS — S22.008D CLOSED FRACTURE OF SPINOUS PROCESS OF THORACIC VERTEBRA WITH ROUTINE HEALING: ICD-10-CM

## 2021-02-08 DIAGNOSIS — S22.008D CLOSED FRACTURE OF SPINOUS PROCESS OF THORACIC VERTEBRA WITH ROUTINE HEALING, SUBSEQUENT ENCOUNTER: ICD-10-CM

## 2021-02-08 DIAGNOSIS — S32.009D CLOSED FRACTURE OF TRANSVERSE PROCESS OF LUMBAR VERTEBRA WITH ROUTINE HEALING, SUBSEQUENT ENCOUNTER: Primary | ICD-10-CM

## 2021-02-08 PROCEDURE — 72070 X-RAY EXAM THORAC SPINE 2VWS: CPT | Mod: 26,,, | Performed by: RADIOLOGY

## 2021-02-08 PROCEDURE — 99999 PR PBB SHADOW E&M-EST. PATIENT-LVL III: ICD-10-PCS | Mod: PBBFAC,,, | Performed by: PHYSICIAN ASSISTANT

## 2021-02-08 PROCEDURE — 99214 OFFICE O/P EST MOD 30 MIN: CPT | Mod: S$PBB,,, | Performed by: PHYSICIAN ASSISTANT

## 2021-02-08 PROCEDURE — 72100 X-RAY EXAM L-S SPINE 2/3 VWS: CPT | Mod: TC,PN

## 2021-02-08 PROCEDURE — 72100 X-RAY EXAM L-S SPINE 2/3 VWS: CPT | Mod: 26,,, | Performed by: RADIOLOGY

## 2021-02-08 PROCEDURE — 99213 OFFICE O/P EST LOW 20 MIN: CPT | Mod: PBBFAC,25,PN | Performed by: PHYSICIAN ASSISTANT

## 2021-02-08 PROCEDURE — 72070 X-RAY EXAM THORAC SPINE 2VWS: CPT | Mod: TC,PN

## 2021-02-08 PROCEDURE — 99214 PR OFFICE/OUTPT VISIT, EST, LEVL IV, 30-39 MIN: ICD-10-PCS | Mod: S$PBB,,, | Performed by: PHYSICIAN ASSISTANT

## 2021-02-08 PROCEDURE — 72100 XR LUMBAR SPINE AP AND LATERAL: ICD-10-PCS | Mod: 26,,, | Performed by: RADIOLOGY

## 2021-02-08 PROCEDURE — 72070 XR THORACIC SPINE AP LATERAL: ICD-10-PCS | Mod: 26,,, | Performed by: RADIOLOGY

## 2021-02-08 PROCEDURE — 99999 PR PBB SHADOW E&M-EST. PATIENT-LVL III: CPT | Mod: PBBFAC,,, | Performed by: PHYSICIAN ASSISTANT

## 2021-02-09 ENCOUNTER — TELEPHONE (OUTPATIENT)
Dept: NEUROSURGERY | Facility: CLINIC | Age: 71
End: 2021-02-09

## 2021-03-28 ENCOUNTER — IMMUNIZATION (OUTPATIENT)
Dept: INTERNAL MEDICINE | Facility: CLINIC | Age: 71
End: 2021-03-28
Payer: MEDICARE

## 2021-03-28 DIAGNOSIS — Z23 NEED FOR VACCINATION: Primary | ICD-10-CM

## 2021-03-28 PROCEDURE — 0011A COVID-19, MRNA, LNP-S, PF, 100 MCG/0.5 ML DOSE VACCINE: CPT | Mod: PBBFAC | Performed by: FAMILY MEDICINE

## 2021-04-25 ENCOUNTER — IMMUNIZATION (OUTPATIENT)
Dept: INTERNAL MEDICINE | Facility: CLINIC | Age: 71
End: 2021-04-25
Payer: MEDICARE

## 2021-04-25 DIAGNOSIS — Z23 NEED FOR VACCINATION: Primary | ICD-10-CM

## 2021-04-25 PROCEDURE — 0012A COVID-19, MRNA, LNP-S, PF, 100 MCG/0.5 ML DOSE VACCINE: CPT | Mod: CV19,S$GLB,, | Performed by: FAMILY MEDICINE

## 2021-04-25 PROCEDURE — 91301 COVID-19, MRNA, LNP-S, PF, 100 MCG/0.5 ML DOSE VACCINE: ICD-10-PCS | Mod: S$GLB,,, | Performed by: FAMILY MEDICINE

## 2021-04-25 PROCEDURE — 0012A COVID-19, MRNA, LNP-S, PF, 100 MCG/0.5 ML DOSE VACCINE: ICD-10-PCS | Mod: CV19,S$GLB,, | Performed by: FAMILY MEDICINE

## 2021-04-25 PROCEDURE — 91301 COVID-19, MRNA, LNP-S, PF, 100 MCG/0.5 ML DOSE VACCINE: CPT | Mod: S$GLB,,, | Performed by: FAMILY MEDICINE

## 2022-04-21 ENCOUNTER — PATIENT OUTREACH (OUTPATIENT)
Dept: ADMINISTRATIVE | Facility: HOSPITAL | Age: 72
End: 2022-04-21
Payer: MEDICARE

## 2022-04-27 ENCOUNTER — PES CALL (OUTPATIENT)
Dept: ADMINISTRATIVE | Facility: CLINIC | Age: 72
End: 2022-04-27
Payer: MEDICARE

## 2022-05-16 ENCOUNTER — PATIENT OUTREACH (OUTPATIENT)
Dept: ADMINISTRATIVE | Facility: HOSPITAL | Age: 72
End: 2022-05-16
Payer: MEDICARE

## 2022-12-07 ENCOUNTER — PES CALL (OUTPATIENT)
Dept: ADMINISTRATIVE | Facility: OTHER | Age: 72
End: 2022-12-07
Payer: MEDICARE
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
